# Patient Record
Sex: FEMALE | Race: WHITE | Employment: OTHER | URBAN - METROPOLITAN AREA
[De-identification: names, ages, dates, MRNs, and addresses within clinical notes are randomized per-mention and may not be internally consistent; named-entity substitution may affect disease eponyms.]

---

## 2017-11-08 ENCOUNTER — OFFICE VISIT (OUTPATIENT)
Dept: ENT CLINIC | Age: 67
End: 2017-11-08

## 2017-11-08 VITALS
DIASTOLIC BLOOD PRESSURE: 67 MMHG | WEIGHT: 278 LBS | HEIGHT: 65 IN | BODY MASS INDEX: 46.32 KG/M2 | SYSTOLIC BLOOD PRESSURE: 130 MMHG | HEART RATE: 78 BPM

## 2017-11-08 DIAGNOSIS — R05.3 PERSISTENT COUGH FOR 3 WEEKS OR LONGER: Primary | ICD-10-CM

## 2017-11-08 PROCEDURE — 96372 THER/PROPH/DIAG INJ SC/IM: CPT | Performed by: OTOLARYNGOLOGY

## 2017-11-08 PROCEDURE — 99214 OFFICE O/P EST MOD 30 MIN: CPT | Performed by: OTOLARYNGOLOGY

## 2017-11-08 RX ORDER — BENZONATATE 100 MG/1
100 CAPSULE ORAL 3 TIMES DAILY PRN
Qty: 30 CAPSULE | Refills: 1 | Status: SHIPPED | OUTPATIENT
Start: 2017-11-08

## 2017-11-08 RX ORDER — METHYLPREDNISOLONE ACETATE 40 MG/ML
40 INJECTION, SUSPENSION INTRA-ARTICULAR; INTRALESIONAL; INTRAMUSCULAR; SOFT TISSUE ONCE
Status: COMPLETED | OUTPATIENT
Start: 2017-11-08 | End: 2017-11-08

## 2017-11-08 RX ORDER — SIMVASTATIN 10 MG
10 TABLET ORAL NIGHTLY
COMMUNITY

## 2017-11-08 RX ORDER — OMEPRAZOLE 20 MG/1
20 CAPSULE, DELAYED RELEASE ORAL
COMMUNITY

## 2017-11-08 RX ADMIN — METHYLPREDNISOLONE ACETATE 40 MG: 40 INJECTION, SUSPENSION INTRA-ARTICULAR; INTRALESIONAL; INTRAMUSCULAR; SOFT TISSUE at 11:18

## 2017-11-08 NOTE — PROGRESS NOTES
The patient has been clearing her throat for approximately a month. Given her history of past vocal cord polyps, she wanted to make sure this was not the case. There has been a scratchy sensation which she feels radiates from the throat up to the back of her mouth. This has been associated with some dryness of her tongue and subsequent fissuring    There is been no further smoking since her vocal cord polyps were removed. Denies difficulty chewing or swallowing. No sores have been seen in the mouth. There has been no hoarseness or change in voice. There is no stridor or difficulty breathing. There is no past history of anterior neck or throat trauma. The cough is not productive and not associated with shortness of breath  It is not positional nerve related to geography or seasons    Her diabetes is well controlled on Glucophage. Her hyperlipidemia is treated with Zocor. She is also on Aldactone and Detrol. She has been started on 20 mg delayed release omeprazole with only slight impact to her throat clearing. She has 1 caffeinated beverage per day    The patient is a non smoker and is not exposed to second hand smoke or irritants. There have been no known contagious contacts. There are  no other symptoms referable to the upper aerodigestive tract. GENERAL:   The patient appears well developed and well nourished. The head is normocephalic and atraumatic; no facial scars or weakness are noted. The facial skeleton is normal.The voice has a normal quality and tonality to it. EARS:  The pinnae are normal bilaterally. The ear canals are clear with no cerumen or narrowing. Both eardrums are normal with good aeration of the middle ear space. There is no evidence of attic retraction pocket or cholesteatoma. The umbo and light reflex appear normal.    EYES:   The conjunctivae and lids appear normal. There is full extraocular movement.                   JAWS/DENTITION:  There is full ROM of the  TM joints without crepitus either audible or palpable. The dentition is grossly normal, including the gingiva. SALIVARY GLANDS:  The parotid and submandibular glands are normal in appearance. There are no palpable stones or masses. Clear secretions emanate from both Clover's and Rebecca's ducts. There is no periglandular adenopathy. NASAL:  The external nose including the dorsum, columella, alae, and nasion is unremarkable. The turbinates respond well to vasoconstriction. The middle and inferior meatuses appeared clear. No polyps, ulceration, or mass are visualized either naris. The nasal septum is midline. NASOPHARYNX:  The mirror exam of the nasopharynx shows no mucosal disease or obstruction. ORAL/PHARYNGEAL:   No abnormal dryness or discrete mucosal lesions are seen at the lips, oral cavity, or oropharynx. There is full tongue mobility, and the fungiform and vallate papillae appear normal. The floor of the mouth is unremarkable. . The palatoglossal and palatopharyngeal folds, uvula, and soft palate do not obstruct the oropharynx. HYPOPHARYNX/LARYNX:  Indirect laryngeal mirror exam shows a normal base of tongue, vallecula, and epiglottis. The aryepiglottic folds appeared normal. No pooling of saliva in the piriform sinuses. The post cricoid space is clear. There is mild nonspecific erythema here, but no pachydermia or cobblestoning Normal appearing plica ventricularis and arytenoids. Both cords are mobile on adduction and abduction. There are no further polyps seen in the larynx    NECK:  The neck is supple with full range of motion. The bony and cartilaginous landmarks are normal to palpation. There is no suspicious mass or adenopathy. The thyroid gland is normal to palpation, and the trachea is midline. CHEST/PULMONARY: Effort normal, no stridor. Not tachypneic. No respiratory distress    NEURO: The patient is alert and oriented. The cranial nerves are intact.     SKIN: Warm and dry; no

## 2018-02-21 ENCOUNTER — HOSPITAL ENCOUNTER (OUTPATIENT)
Dept: PULMONOLOGY | Age: 68
Discharge: HOME | End: 2018-02-21
Attending: INTERNAL MEDICINE | Admitting: INTERNAL MEDICINE

## 2018-02-21 VITALS — OXYGEN SATURATION: 97 %

## 2018-02-21 DIAGNOSIS — R06.02 SHORTNESS OF BREATH: ICD-10-CM

## 2018-02-21 RX ORDER — ALBUTEROL SULFATE 90 UG/1
4 AEROSOL, METERED RESPIRATORY (INHALATION) ONCE
Status: COMPLETED | OUTPATIENT
Start: 2018-02-21 | End: 2018-02-21

## 2018-02-21 RX ADMIN — ALBUTEROL SULFATE 4 PUFF: 90 AEROSOL, METERED RESPIRATORY (INHALATION) at 08:28

## 2019-02-21 ENCOUNTER — HOSPITAL ENCOUNTER (OUTPATIENT)
Age: 69
Discharge: HOME | End: 2019-02-21
Payer: MEDICARE

## 2019-02-21 ENCOUNTER — HOSPITAL ENCOUNTER (OUTPATIENT)
Dept: GENERAL RADIOLOGY | Age: 69
Discharge: HOME | End: 2019-02-21
Payer: MEDICARE

## 2019-02-21 DIAGNOSIS — M25.552 LEFT HIP PAIN: ICD-10-CM

## 2019-02-21 PROCEDURE — 73502 X-RAY EXAM HIP UNI 2-3 VIEWS: CPT

## 2020-09-04 ENCOUNTER — OFFICE VISIT (OUTPATIENT)
Dept: ENT CLINIC | Age: 70
End: 2020-09-04
Payer: MEDICARE

## 2020-09-04 VITALS
HEART RATE: 78 BPM | TEMPERATURE: 97.3 F | HEIGHT: 65 IN | DIASTOLIC BLOOD PRESSURE: 70 MMHG | WEIGHT: 277 LBS | BODY MASS INDEX: 46.15 KG/M2 | SYSTOLIC BLOOD PRESSURE: 124 MMHG

## 2020-09-04 PROCEDURE — 69210 REMOVE IMPACTED EAR WAX UNI: CPT | Performed by: OTOLARYNGOLOGY

## 2020-09-04 PROCEDURE — 99213 OFFICE O/P EST LOW 20 MIN: CPT | Performed by: OTOLARYNGOLOGY

## 2020-09-04 ASSESSMENT — ENCOUNTER SYMPTOMS
EYE PAIN: 0
DIARRHEA: 0
SHORTNESS OF BREATH: 0
NAUSEA: 0
COLOR CHANGE: 0
PHOTOPHOBIA: 0
TROUBLE SWALLOWING: 0
FACIAL SWELLING: 0
VOICE CHANGE: 0
STRIDOR: 0
SINUS PRESSURE: 0
SORE THROAT: 0
EYE ITCHING: 0
SINUS PAIN: 0
RHINORRHEA: 0
EYE REDNESS: 0
CHOKING: 0
COUGH: 0

## 2020-09-04 NOTE — PROGRESS NOTES
[facility]  [address] [address] [facility], [address] [place] [address]  P: [phone]  F: [phone]       Patient     Ellen Cortes  1950    ChiefComplaint     Chief Complaint   Patient presents with    Cerumen Impaction     Patient is here today because she is a former patient of Dr. Devika Carrillo, she has trouble with wax build up, her shehas a left ear infection that she has been treating it with alcohol, debrox and ear drops to help with the pain, it has helped with the symptoms but she would still like her ears looked at       History of Present Illness     Ellen Cortes is a pleasant 79 y.o. female known to a colleague, but new to me. Presents today for some muffled hearing, tinnitus and otalgia in the left. She does have a history of issues with cerumen impaction. She has had a previous audiogram which showed some mild hearing loss. She feels that her cerumen is causing hearing loss and tinnitus today. Denies any dizzy sensation. Denies any otorrhea. She has been using Debrox and alcohol drops with minimal relief. This is been going on for a few months now.     Past Medical History     Past Medical History:   Diagnosis Date    Arthritis     Cardiac murmur     Chronic edema     Diabetes mellitus (HCC)     Environmental allergies     on shots    Hypertension     Obesity     Venous insufficiency (chronic) (peripheral)     Vocal cord polyp        Past Surgical History     Past Surgical History:   Procedure Laterality Date    BACK SURGERY      COLPOSCOPY      JOINT REPLACEMENT  6/27/12    right total knee    KNEE SURGERY  2006    right    KNEE SURGERY  8/15/12    TOTAL LEFT KNEE REPLACEMENT    MICROLARYNGOSCOPY W BIOPSY  1/13/2012    nasal endoscopy    POLYPECTOMY      vocal cord       Family History     Family History   Problem Relation Age of Onset    Heart Disease Sister     Arthritis Sister     Arthritis Brother        Social History     Social History capsule 1    olopatadine (PATANASE) 0.6 % SOLN nassl soln 2 sprays by Nasal route 2 times daily 1 Bottle 0    budesonide (RINOCORT AQUA) 32 MCG/ACT nasal spray SPRAY TWO SPRAYS IN EACH NOSTRIL ONCE DAILY 1 Bottle 1    tolterodine (DETROL LA) 2 MG ER capsule       magnesium citrate solution Take 296 mLs by mouth once      metFORMIN (GLUCOPHAGE) 500 MG tablet Take 500 mg by mouth 2 times daily (with meals).  spironolactone (ALDACTONE) 50 MG tablet TAKE ONE AND ONE-HALF (1 & 1/2) TABLET BY MOUTH ONCE DAILY 135 tablet 1     No current facility-administered medications for this visit. Review of Systems     Review of Systems   Constitutional: Negative for chills, fatigue and fever. HENT: Negative for congestion, ear discharge, ear pain, facial swelling, hearing loss, nosebleeds, postnasal drip, rhinorrhea, sinus pressure, sinus pain, sneezing, sore throat, tinnitus, trouble swallowing and voice change. Eyes: Negative for photophobia, pain, redness, itching and visual disturbance. Respiratory: Negative for cough, choking, shortness of breath and stridor. Gastrointestinal: Negative for diarrhea and nausea. Musculoskeletal: Negative for neck pain and neck stiffness. Skin: Negative for color change and rash. Neurological: Negative for dizziness, facial asymmetry and light-headedness. Hematological: Negative for adenopathy. Psychiatric/Behavioral: Negative for agitation and confusion. PhysicalExam     Vitals:    09/04/20 1319   BP: 124/70   Pulse: 78   Temp: 97.3 °F (36.3 °C)       Physical Exam  Constitutional:       Appearance: She is well-developed. HENT:      Head: Normocephalic and atraumatic. Jaw: No trismus. Right Ear: Tympanic membrane, ear canal and external ear normal. No drainage. No middle ear effusion. There is impacted cerumen. Tympanic membrane is not perforated. Left Ear: Tympanic membrane, ear canal and external ear normal. No drainage.   No middle

## 2022-06-09 ENCOUNTER — TELEPHONE (OUTPATIENT)
Dept: CARDIOLOGY CLINIC | Age: 72
End: 2022-06-09

## 2022-06-09 NOTE — TELEPHONE ENCOUNTER
Pt is changing cardiologist due to moving. Pt has a new pt ov with [facility] on 07/21. Pt stated that she most recently has follow up with Dr. Audrey Ulrich at [facility]. [phone].

## 2022-12-19 ENCOUNTER — HOSPITAL ENCOUNTER (INPATIENT)
Age: 72
LOS: 4 days | Discharge: HOME | End: 2022-12-23
Attending: STUDENT IN AN ORGANIZED HEALTH CARE EDUCATION/TRAINING PROGRAM | Admitting: INTERNAL MEDICINE
Payer: MEDICARE

## 2022-12-19 DIAGNOSIS — L03.116 CELLULITIS OF LEFT LOWER EXTREMITY: Primary | ICD-10-CM

## 2022-12-19 PROBLEM — L03.90 CELLULITIS: Status: ACTIVE | Noted: 2022-12-19

## 2022-12-19 LAB
A/G RATIO: 1 (ref 1.1–2.2)
ALBUMIN SERPL-MCNC: 3.5 G/DL (ref 3.4–5)
ALP BLD-CCNC: 114 U/L (ref 40–129)
ALT SERPL-CCNC: 27 U/L (ref 10–40)
ANION GAP SERPL CALCULATED.3IONS-SCNC: 15 MMOL/L (ref 3–16)
AST SERPL-CCNC: 34 U/L (ref 15–37)
BASOPHILS ABSOLUTE: 0.1 K/UL (ref 0–0.2)
BASOPHILS RELATIVE PERCENT: 0.4 %
BILIRUB SERPL-MCNC: 0.7 MG/DL (ref 0–1)
BUN BLDV-MCNC: 27 MG/DL (ref 7–20)
CALCIUM SERPL-MCNC: 10 MG/DL (ref 8.3–10.6)
CHLORIDE BLD-SCNC: 98 MMOL/L (ref 99–110)
CO2: 20 MMOL/L (ref 21–32)
CREAT SERPL-MCNC: 1.7 MG/DL (ref 0.6–1.2)
EOSINOPHILS ABSOLUTE: 0 K/UL (ref 0–0.6)
EOSINOPHILS RELATIVE PERCENT: 0.2 %
GFR SERPL CREATININE-BSD FRML MDRD: 32 ML/MIN/{1.73_M2}
GLUCOSE BLD-MCNC: 111 MG/DL (ref 70–99)
GLUCOSE BLD-MCNC: 115 MG/DL (ref 70–99)
HCT VFR BLD CALC: 38.5 % (ref 36–48)
HEMOGLOBIN: 13.1 G/DL (ref 12–16)
INFLUENZA A: NOT DETECTED
INFLUENZA B: NOT DETECTED
LACTIC ACID: 2.2 MMOL/L (ref 0.4–2)
LYMPHOCYTES ABSOLUTE: 0.6 K/UL (ref 1–5.1)
LYMPHOCYTES RELATIVE PERCENT: 3.1 %
MCH RBC QN AUTO: 29.8 PG (ref 26–34)
MCHC RBC AUTO-ENTMCNC: 34 G/DL (ref 31–36)
MCV RBC AUTO: 87.7 FL (ref 80–100)
MONOCYTES ABSOLUTE: 0.7 K/UL (ref 0–1.3)
MONOCYTES RELATIVE PERCENT: 3.5 %
NEUTROPHILS ABSOLUTE: 18.5 K/UL (ref 1.7–7.7)
NEUTROPHILS RELATIVE PERCENT: 92.8 %
PDW BLD-RTO: 15.3 % (ref 12.4–15.4)
PERFORMED ON: ABNORMAL
PLATELET # BLD: 161 K/UL (ref 135–450)
PMV BLD AUTO: 9.2 FL (ref 5–10.5)
POTASSIUM REFLEX MAGNESIUM: 4.5 MMOL/L (ref 3.5–5.1)
RBC # BLD: 4.39 M/UL (ref 4–5.2)
SARS-COV-2 RNA, RT PCR: NOT DETECTED
SODIUM BLD-SCNC: 133 MMOL/L (ref 136–145)
TOTAL PROTEIN: 7.1 G/DL (ref 6.4–8.2)
WBC # BLD: 19.9 K/UL (ref 4–11)

## 2022-12-19 PROCEDURE — 1200000000 HC SEMI PRIVATE

## 2022-12-19 PROCEDURE — 83605 ASSAY OF LACTIC ACID: CPT

## 2022-12-19 PROCEDURE — 99285 EMERGENCY DEPT VISIT HI MDM: CPT

## 2022-12-19 PROCEDURE — 87040 BLOOD CULTURE FOR BACTERIA: CPT

## 2022-12-19 PROCEDURE — 6360000002 HC RX W HCPCS: Performed by: STUDENT IN AN ORGANIZED HEALTH CARE EDUCATION/TRAINING PROGRAM

## 2022-12-19 PROCEDURE — 85025 COMPLETE CBC W/AUTO DIFF WBC: CPT

## 2022-12-19 PROCEDURE — 6360000002 HC RX W HCPCS: Performed by: INTERNAL MEDICINE

## 2022-12-19 PROCEDURE — 36415 COLL VENOUS BLD VENIPUNCTURE: CPT

## 2022-12-19 PROCEDURE — 2580000003 HC RX 258: Performed by: INTERNAL MEDICINE

## 2022-12-19 PROCEDURE — 80053 COMPREHEN METABOLIC PANEL: CPT

## 2022-12-19 PROCEDURE — 87636 SARSCOV2 & INF A&B AMP PRB: CPT

## 2022-12-19 PROCEDURE — 2580000003 HC RX 258: Performed by: STUDENT IN AN ORGANIZED HEALTH CARE EDUCATION/TRAINING PROGRAM

## 2022-12-19 PROCEDURE — 96374 THER/PROPH/DIAG INJ IV PUSH: CPT

## 2022-12-19 PROCEDURE — 6370000000 HC RX 637 (ALT 250 FOR IP): Performed by: INTERNAL MEDICINE

## 2022-12-19 RX ORDER — ACETAMINOPHEN 325 MG/1
650 TABLET ORAL EVERY 6 HOURS PRN
Status: DISCONTINUED | OUTPATIENT
Start: 2022-12-19 | End: 2022-12-23 | Stop reason: HOSPADM

## 2022-12-19 RX ORDER — SODIUM CHLORIDE 0.9 % (FLUSH) 0.9 %
5-40 SYRINGE (ML) INJECTION EVERY 12 HOURS SCHEDULED
Status: DISCONTINUED | OUTPATIENT
Start: 2022-12-19 | End: 2022-12-23 | Stop reason: HOSPADM

## 2022-12-19 RX ORDER — ENOXAPARIN SODIUM 100 MG/ML
30 INJECTION SUBCUTANEOUS 2 TIMES DAILY
Status: DISCONTINUED | OUTPATIENT
Start: 2022-12-19 | End: 2022-12-20

## 2022-12-19 RX ORDER — ATORVASTATIN CALCIUM 40 MG/1
40 TABLET, FILM COATED ORAL DAILY
COMMUNITY

## 2022-12-19 RX ORDER — FUROSEMIDE 80 MG
80 TABLET ORAL DAILY PRN
COMMUNITY

## 2022-12-19 RX ORDER — ACETAMINOPHEN 650 MG/1
650 SUPPOSITORY RECTAL EVERY 6 HOURS PRN
Status: DISCONTINUED | OUTPATIENT
Start: 2022-12-19 | End: 2022-12-23 | Stop reason: HOSPADM

## 2022-12-19 RX ORDER — ASPIRIN 81 MG/1
81 TABLET, CHEWABLE ORAL DAILY
COMMUNITY

## 2022-12-19 RX ORDER — ASCORBIC ACID 500 MG
1000 TABLET ORAL DAILY
COMMUNITY

## 2022-12-19 RX ORDER — ZINC GLUCONATE 50 MG
50 TABLET ORAL DAILY
COMMUNITY

## 2022-12-19 RX ORDER — ONDANSETRON 2 MG/ML
4 INJECTION INTRAMUSCULAR; INTRAVENOUS EVERY 6 HOURS PRN
Status: DISCONTINUED | OUTPATIENT
Start: 2022-12-19 | End: 2022-12-23 | Stop reason: HOSPADM

## 2022-12-19 RX ORDER — INSULIN LISPRO 100 [IU]/ML
0-4 INJECTION, SOLUTION INTRAVENOUS; SUBCUTANEOUS
Status: DISCONTINUED | OUTPATIENT
Start: 2022-12-20 | End: 2022-12-23 | Stop reason: HOSPADM

## 2022-12-19 RX ORDER — INSULIN LISPRO 100 [IU]/ML
0-4 INJECTION, SOLUTION INTRAVENOUS; SUBCUTANEOUS NIGHTLY
Status: DISCONTINUED | OUTPATIENT
Start: 2022-12-19 | End: 2022-12-23 | Stop reason: HOSPADM

## 2022-12-19 RX ORDER — PANTOPRAZOLE SODIUM 40 MG/1
40 TABLET, DELAYED RELEASE ORAL
Status: DISCONTINUED | OUTPATIENT
Start: 2022-12-20 | End: 2022-12-19

## 2022-12-19 RX ORDER — WARFARIN SODIUM 5 MG/1
5 TABLET ORAL
COMMUNITY

## 2022-12-19 RX ORDER — CHOLECALCIFEROL (VITAMIN D3) 25 MCG
400 TABLET ORAL DAILY
COMMUNITY

## 2022-12-19 RX ORDER — SPIRONOLACTONE 25 MG/1
50 TABLET ORAL DAILY
Status: DISCONTINUED | OUTPATIENT
Start: 2022-12-20 | End: 2022-12-23 | Stop reason: HOSPADM

## 2022-12-19 RX ORDER — ONDANSETRON 4 MG/1
4 TABLET, ORALLY DISINTEGRATING ORAL EVERY 8 HOURS PRN
Status: DISCONTINUED | OUTPATIENT
Start: 2022-12-19 | End: 2022-12-23 | Stop reason: HOSPADM

## 2022-12-19 RX ORDER — DEXTROSE MONOHYDRATE 100 MG/ML
INJECTION, SOLUTION INTRAVENOUS CONTINUOUS PRN
Status: DISCONTINUED | OUTPATIENT
Start: 2022-12-19 | End: 2022-12-23 | Stop reason: HOSPADM

## 2022-12-19 RX ORDER — POLYETHYLENE GLYCOL 3350 17 G/17G
17 POWDER, FOR SOLUTION ORAL DAILY PRN
Status: DISCONTINUED | OUTPATIENT
Start: 2022-12-19 | End: 2022-12-23 | Stop reason: HOSPADM

## 2022-12-19 RX ORDER — SODIUM CHLORIDE 9 MG/ML
INJECTION, SOLUTION INTRAVENOUS PRN
Status: DISCONTINUED | OUTPATIENT
Start: 2022-12-19 | End: 2022-12-23 | Stop reason: HOSPADM

## 2022-12-19 RX ORDER — SODIUM CHLORIDE 0.9 % (FLUSH) 0.9 %
5-40 SYRINGE (ML) INJECTION PRN
Status: DISCONTINUED | OUTPATIENT
Start: 2022-12-19 | End: 2022-12-23 | Stop reason: HOSPADM

## 2022-12-19 RX ORDER — ATORVASTATIN CALCIUM 10 MG/1
20 TABLET, FILM COATED ORAL NIGHTLY
Status: DISCONTINUED | OUTPATIENT
Start: 2022-12-19 | End: 2022-12-19 | Stop reason: DRUGHIGH

## 2022-12-19 RX ORDER — ATORVASTATIN CALCIUM 40 MG/1
40 TABLET, FILM COATED ORAL NIGHTLY
Status: DISCONTINUED | OUTPATIENT
Start: 2022-12-19 | End: 2022-12-23 | Stop reason: HOSPADM

## 2022-12-19 RX ADMIN — ACETAMINOPHEN 650 MG: 325 TABLET ORAL at 20:49

## 2022-12-19 RX ADMIN — CEFAZOLIN 2000 MG: 2 INJECTION, POWDER, FOR SOLUTION INTRAMUSCULAR; INTRAVENOUS at 17:03

## 2022-12-19 RX ADMIN — AMPICILLIN SODIUM AND SULBACTAM SODIUM 3000 MG: 2; 1 INJECTION, POWDER, FOR SOLUTION INTRAMUSCULAR; INTRAVENOUS at 22:35

## 2022-12-19 RX ADMIN — ONDANSETRON 4 MG: 4 TABLET, ORALLY DISINTEGRATING ORAL at 20:49

## 2022-12-19 ASSESSMENT — PAIN DESCRIPTION - PAIN TYPE: TYPE: ACUTE PAIN

## 2022-12-19 ASSESSMENT — PAIN DESCRIPTION - ORIENTATION: ORIENTATION: LEFT

## 2022-12-19 ASSESSMENT — PAIN DESCRIPTION - LOCATION: LOCATION: LEG

## 2022-12-19 ASSESSMENT — PAIN SCALES - GENERAL
PAINLEVEL_OUTOF10: 4
PAINLEVEL_OUTOF10: 6

## 2022-12-19 ASSESSMENT — PAIN DESCRIPTION - ONSET: ONSET: ON-GOING

## 2022-12-19 ASSESSMENT — PAIN DESCRIPTION - DESCRIPTORS: DESCRIPTORS: ACHING;TENDER

## 2022-12-19 ASSESSMENT — PAIN - FUNCTIONAL ASSESSMENT
PAIN_FUNCTIONAL_ASSESSMENT: 0-10
PAIN_FUNCTIONAL_ASSESSMENT: ACTIVITIES ARE NOT PREVENTED

## 2022-12-19 ASSESSMENT — PAIN DESCRIPTION - FREQUENCY: FREQUENCY: INTERMITTENT

## 2022-12-19 NOTE — ED PROVIDER NOTES
[address] ED      CHIEF COMPLAINT  Leg Swelling (Left lower leg red and swollen; pt states she has chronic cellulitis to left leg; increased redness and swelling x 4 days )     HISTORY OF PRESENT ILLNESS  Adry Nicole is a 67 y.o. female  who presents to the ED complaining of left lower extremity pain, swelling, and redness. She has had a history of recurrent cellulitis to her left leg and has required IV antibiotics in the past.  States that symptoms worsened over the past 4 days. She states that she has had some fevers at home. Has not been on any recent antibiotics. She denies any other complaints or concerns. No other complaints, modifying factors or associated symptoms. I have reviewed the following from the nursing documentation.     Past Medical History:   Diagnosis Date    Arthritis     Cardiac murmur     Chronic edema     Diabetes mellitus (HCC)     Environmental allergies     on shots    Hypertension     Obesity     Venous insufficiency (chronic) (peripheral)     Vocal cord polyp      Past Surgical History:   Procedure Laterality Date    BACK SURGERY      COLPOSCOPY      JOINT REPLACEMENT  6/27/12    right total knee    KNEE SURGERY  2006    right    KNEE SURGERY  8/15/12    TOTAL LEFT KNEE REPLACEMENT    MICROLARYNGOSCOPY W BIOPSY  1/13/2012    nasal endoscopy    POLYPECTOMY      vocal cord     Family History   Problem Relation Age of Onset    Heart Disease Sister     Arthritis Sister     Arthritis Brother      Social History     Socioeconomic History    Marital status:      Spouse name: Not on file    Number of children: Not on file    Years of education: Not on file    Highest education level: Not on file   Occupational History    Not on file   Tobacco Use    Smoking status: Former     Packs/day: 1.00     Years: 40.00     Pack years: 40.00     Types: Cigarettes    Smokeless tobacco: Never   Substance and Sexual Activity    Alcohol use: Yes     Comment: 1/day    Drug use: No    Sexual activity: Not on file   Other Topics Concern    Not on file   Social History Narrative    Not on file     Social Determinants of Health     Financial Resource Strain: Not on file   Food Insecurity: Not on file   Transportation Needs: Not on file   Physical Activity: Not on file   Stress: Not on file   Social Connections: Not on file   Intimate Partner Violence: Not on file   Housing Stability: Not on file     No current facility-administered medications for this encounter. Current Outpatient Medications   Medication Sig Dispense Refill    omeprazole (PRILOSEC) 20 MG delayed release capsule Take 20 mg by mouth      simvastatin (ZOCOR) 10 MG tablet Take 10 mg by mouth nightly      benzonatate (TESSALON PERLES) 100 MG capsule Take 1 capsule by mouth 3 times daily as needed for Cough 30 capsule 1    olopatadine (PATANASE) 0.6 % SOLN nassl soln 2 sprays by Nasal route 2 times daily 1 Bottle 0    budesonide (RINOCORT AQUA) 32 MCG/ACT nasal spray SPRAY TWO SPRAYS IN EACH NOSTRIL ONCE DAILY 1 Bottle 1    tolterodine (DETROL LA) 2 MG ER capsule       magnesium citrate solution Take 296 mLs by mouth once      metFORMIN (GLUCOPHAGE) 500 MG tablet Take 500 mg by mouth 2 times daily (with meals). spironolactone (ALDACTONE) 50 MG tablet TAKE ONE AND ONE-HALF (1 & 1/2) TABLET BY MOUTH ONCE DAILY 135 tablet 1     Allergies   Allergen Reactions    Ace Inhibitors     Lisinopril Swelling     throat    Mobic Swelling     throat    Morphine Hives, Itching and Swelling     Swelling face and neck       REVIEW OF SYSTEMS  10 systems reviewed, pertinent positives per HPI otherwise noted to be negative. PHYSICAL EXAM  /64   Pulse 76   Temp 97.1 °F (36.2 °C) (Oral)   Resp 18   Ht 5' 5\" (1.651 m)   Wt 277 lb (125.6 kg)   SpO2 100%   BMI 46.10 kg/m²    GENERAL APPEARANCE: Awake and alert. Cooperative. No acute distress. HENT: Normocephalic. Atraumatic. Mucous membranes are moist.  NECK: Supple. 20 mg/dL    Creatinine 1.7 (H) 0.6 - 1.2 mg/dL    EstAnant Filt Rate 32 (A) >60    Calcium 10.0 8.3 - 10.6 mg/dL    Total Protein 7.1 6.4 - 8.2 g/dL    Albumin 3.5 3.4 - 5.0 g/dL    Albumin/Globulin Ratio 1.0 (L) 1.1 - 2.2    Total Bilirubin 0.7 0.0 - 1.0 mg/dL    Alkaline Phosphatase 114 40 - 129 U/L    ALT 27 10 - 40 U/L    AST 34 15 - 37 U/L   Lactic Acid   Result Value Ref Range    Lactic Acid 2.2 (H) 0.4 - 2.0 mmol/L     RADIOLOGY  No orders to display     ED COURSE/MDM  Patient seen and evaluated. Old records reviewed. Labs and imaging reviewed and results discussed with patient. Patient is a 77-year-old female, with history of recurrent cellulitis of the left leg, presenting with concerns for redness warmth swelling and pain in her left lower extremity for the past 4 days. Full HPI as detailed above. Upon arrival in the ED, vitals reassuring. Patient is resting comfortably and is in no acute distress. She does have extensive cellulitis of her left leg. Lactate is mildly elevated at 2.2. She has a white count of 19,000. She has been septic from cellulitis in the past and given how extensive her cellulitis is currently I do feel that she requires hospitalization for treatment with IV antibiotics. She is comfortable in agreement with the plan of care. I, Dr. Mg Gunderson MD, am the primary clinician of record. Is this patient to be included in the SEP-1 Core Measure? No   Exclusion criteria - the patient is NOT to be included for SEP-1 Core Measure due to:  2+ SIRS criteria are not met     During the patient's ED course, the patient was given:  Medications   ceFAZolin (ANCEF) 2,000 mg in sodium chloride 0.9 % 50 mL IVPB (mini-bag) (2,000 mg IntraVENous New Bag 12/19/22 1703)        CLINICAL IMPRESSION  1. Cellulitis of left lower extremity        Blood pressure 107/64, pulse 76, temperature 97.1 °F (36.2 °C), temperature source Oral, resp.  rate 18, height 5' 5\" (1.651 m), weight 277 lb (125.6 kg), SpO2 100 %, not currently breastfeeding. [address] Patience Oiler was admitted in stable condition. Patient was given scripts for the following medications. I counseled patient how to take these medications. New Prescriptions    No medications on file       Follow-up with:  No follow-up provider specified. DISCLAIMER: This chart was created using Dragon dictation software. Efforts were made by me to ensure accuracy, however some errors may be present due to limitations of this technology and occasionally words are not transcribed correctly.        Daniella Galindo MD  12/19/22 0845

## 2022-12-19 NOTE — ED NOTES
[address] [facility] sent to Dr. Adriano Devlin  12/19/22 2146 [id number] consult completed with call back from Dr. Charity Edmondson speaking with Dr. Soo Au  12/19/22 [phone]

## 2022-12-20 PROBLEM — I95.9 HYPOTENSION: Status: ACTIVE | Noted: 2022-12-20

## 2022-12-20 PROBLEM — N17.9 SEPSIS WITH ACUTE RENAL FAILURE WITHOUT SEPTIC SHOCK (HCC): Status: ACTIVE | Noted: 2022-12-20

## 2022-12-20 PROBLEM — A41.9 SEPSIS WITH ACUTE RENAL FAILURE WITHOUT SEPTIC SHOCK (HCC): Status: ACTIVE | Noted: 2022-12-20

## 2022-12-20 PROBLEM — N17.9 AKI (ACUTE KIDNEY INJURY) (HCC): Status: ACTIVE | Noted: 2022-12-20

## 2022-12-20 PROBLEM — D72.829 LEUKOCYTOSIS: Status: ACTIVE | Noted: 2022-12-20

## 2022-12-20 PROBLEM — R65.20 SEPSIS WITH ACUTE RENAL FAILURE WITHOUT SEPTIC SHOCK (HCC): Status: ACTIVE | Noted: 2022-12-20

## 2022-12-20 LAB
BASOPHILS ABSOLUTE: 0.1 K/UL (ref 0–0.2)
BASOPHILS RELATIVE PERCENT: 0.4 %
EOSINOPHILS ABSOLUTE: 0 K/UL (ref 0–0.6)
EOSINOPHILS RELATIVE PERCENT: 0.1 %
GLUCOSE BLD-MCNC: 103 MG/DL (ref 70–99)
GLUCOSE BLD-MCNC: 113 MG/DL (ref 70–99)
GLUCOSE BLD-MCNC: 88 MG/DL (ref 70–99)
GLUCOSE BLD-MCNC: 99 MG/DL (ref 70–99)
HCT VFR BLD CALC: 39.1 % (ref 36–48)
HEMOGLOBIN: 13.1 G/DL (ref 12–16)
INR BLD: 2.13 (ref 0.87–1.14)
LYMPHOCYTES ABSOLUTE: 1 K/UL (ref 1–5.1)
LYMPHOCYTES RELATIVE PERCENT: 5.4 %
MCH RBC QN AUTO: 29.4 PG (ref 26–34)
MCHC RBC AUTO-ENTMCNC: 33.4 G/DL (ref 31–36)
MCV RBC AUTO: 88 FL (ref 80–100)
MONOCYTES ABSOLUTE: 0.8 K/UL (ref 0–1.3)
MONOCYTES RELATIVE PERCENT: 4.6 %
NEUTROPHILS ABSOLUTE: 16.1 K/UL (ref 1.7–7.7)
NEUTROPHILS RELATIVE PERCENT: 89.5 %
PDW BLD-RTO: 15.3 % (ref 12.4–15.4)
PERFORMED ON: ABNORMAL
PERFORMED ON: ABNORMAL
PERFORMED ON: NORMAL
PERFORMED ON: NORMAL
PLATELET # BLD: 168 K/UL (ref 135–450)
PMV BLD AUTO: 9.7 FL (ref 5–10.5)
PROTHROMBIN TIME: 23.7 SEC (ref 11.7–14.5)
RBC # BLD: 4.45 M/UL (ref 4–5.2)
WBC # BLD: 18 K/UL (ref 4–11)

## 2022-12-20 PROCEDURE — 1200000000 HC SEMI PRIVATE

## 2022-12-20 PROCEDURE — 6370000000 HC RX 637 (ALT 250 FOR IP): Performed by: INTERNAL MEDICINE

## 2022-12-20 PROCEDURE — 6370000000 HC RX 637 (ALT 250 FOR IP): Performed by: NURSE PRACTITIONER

## 2022-12-20 PROCEDURE — 85610 PROTHROMBIN TIME: CPT

## 2022-12-20 PROCEDURE — 36415 COLL VENOUS BLD VENIPUNCTURE: CPT

## 2022-12-20 PROCEDURE — 2580000003 HC RX 258: Performed by: NURSE PRACTITIONER

## 2022-12-20 PROCEDURE — 99222 1ST HOSP IP/OBS MODERATE 55: CPT | Performed by: NURSE PRACTITIONER

## 2022-12-20 PROCEDURE — 2580000003 HC RX 258: Performed by: INTERNAL MEDICINE

## 2022-12-20 PROCEDURE — 6360000002 HC RX W HCPCS: Performed by: INTERNAL MEDICINE

## 2022-12-20 PROCEDURE — 85025 COMPLETE CBC W/AUTO DIFF WBC: CPT

## 2022-12-20 RX ORDER — ALBUTEROL SULFATE 90 UG/1
2 AEROSOL, METERED RESPIRATORY (INHALATION) EVERY 4 HOURS PRN
Status: DISCONTINUED | OUTPATIENT
Start: 2022-12-20 | End: 2022-12-23 | Stop reason: HOSPADM

## 2022-12-20 RX ORDER — ASPIRIN 81 MG/1
81 TABLET, CHEWABLE ORAL DAILY
Status: DISCONTINUED | OUTPATIENT
Start: 2022-12-20 | End: 2022-12-23 | Stop reason: HOSPADM

## 2022-12-20 RX ORDER — SODIUM CHLORIDE 9 MG/ML
INJECTION, SOLUTION INTRAVENOUS CONTINUOUS
Status: DISCONTINUED | OUTPATIENT
Start: 2022-12-20 | End: 2022-12-21

## 2022-12-20 RX ORDER — HYDROXYZINE HYDROCHLORIDE 10 MG/1
10 TABLET, FILM COATED ORAL 3 TIMES DAILY PRN
Status: DISCONTINUED | OUTPATIENT
Start: 2022-12-20 | End: 2022-12-23 | Stop reason: HOSPADM

## 2022-12-20 RX ADMIN — AMPICILLIN SODIUM AND SULBACTAM SODIUM 3000 MG: 2; 1 INJECTION, POWDER, FOR SOLUTION INTRAMUSCULAR; INTRAVENOUS at 23:17

## 2022-12-20 RX ADMIN — WARFARIN SODIUM 7 MG: 5 TABLET ORAL at 17:43

## 2022-12-20 RX ADMIN — SODIUM CHLORIDE: 9 INJECTION, SOLUTION INTRAVENOUS at 12:34

## 2022-12-20 RX ADMIN — AMPICILLIN SODIUM AND SULBACTAM SODIUM 3000 MG: 2; 1 INJECTION, POWDER, FOR SOLUTION INTRAMUSCULAR; INTRAVENOUS at 04:39

## 2022-12-20 RX ADMIN — AMPICILLIN SODIUM AND SULBACTAM SODIUM 3000 MG: 2; 1 INJECTION, POWDER, FOR SOLUTION INTRAMUSCULAR; INTRAVENOUS at 12:35

## 2022-12-20 RX ADMIN — ONDANSETRON 4 MG: 4 TABLET, ORALLY DISINTEGRATING ORAL at 04:50

## 2022-12-20 RX ADMIN — METFORMIN HYDROCHLORIDE 500 MG: 500 TABLET ORAL at 08:27

## 2022-12-20 RX ADMIN — ACETAMINOPHEN 650 MG: 325 TABLET ORAL at 14:15

## 2022-12-20 RX ADMIN — SODIUM CHLORIDE: 9 INJECTION, SOLUTION INTRAVENOUS at 17:38

## 2022-12-20 RX ADMIN — ACETAMINOPHEN 650 MG: 325 TABLET ORAL at 04:50

## 2022-12-20 RX ADMIN — AMPICILLIN SODIUM AND SULBACTAM SODIUM 3000 MG: 2; 1 INJECTION, POWDER, FOR SOLUTION INTRAMUSCULAR; INTRAVENOUS at 17:41

## 2022-12-20 RX ADMIN — SPIRONOLACTONE 50 MG: 25 TABLET ORAL at 08:27

## 2022-12-20 RX ADMIN — ASPIRIN 81 MG: 81 TABLET, CHEWABLE ORAL at 14:15

## 2022-12-20 RX ADMIN — SODIUM CHLORIDE, PRESERVATIVE FREE 10 ML: 5 INJECTION INTRAVENOUS at 12:41

## 2022-12-20 RX ADMIN — HYDROXYZINE HYDROCHLORIDE 10 MG: 10 TABLET ORAL at 22:22

## 2022-12-20 RX ADMIN — ENOXAPARIN SODIUM 30 MG: 100 INJECTION SUBCUTANEOUS at 08:27

## 2022-12-20 RX ADMIN — ATORVASTATIN CALCIUM 40 MG: 40 TABLET, FILM COATED ORAL at 20:32

## 2022-12-20 ASSESSMENT — PAIN DESCRIPTION - DESCRIPTORS
DESCRIPTORS: ACHING;TENDER;BURNING
DESCRIPTORS: PRESSURE
DESCRIPTORS: ACHING;BURNING

## 2022-12-20 ASSESSMENT — PAIN DESCRIPTION - ORIENTATION
ORIENTATION: LEFT

## 2022-12-20 ASSESSMENT — PAIN - FUNCTIONAL ASSESSMENT
PAIN_FUNCTIONAL_ASSESSMENT: ACTIVITIES ARE NOT PREVENTED
PAIN_FUNCTIONAL_ASSESSMENT: PREVENTS OR INTERFERES SOME ACTIVE ACTIVITIES AND ADLS

## 2022-12-20 ASSESSMENT — PAIN DESCRIPTION - ONSET: ONSET: ON-GOING

## 2022-12-20 ASSESSMENT — PAIN DESCRIPTION - PAIN TYPE: TYPE: ACUTE PAIN

## 2022-12-20 ASSESSMENT — PAIN DESCRIPTION - LOCATION
LOCATION: LEG

## 2022-12-20 ASSESSMENT — PAIN SCALES - GENERAL
PAINLEVEL_OUTOF10: 2
PAINLEVEL_OUTOF10: 4
PAINLEVEL_OUTOF10: 2

## 2022-12-20 ASSESSMENT — PAIN DESCRIPTION - FREQUENCY: FREQUENCY: INTERMITTENT

## 2022-12-20 NOTE — PLAN OF CARE
Problem: Pain  Goal: Verbalizes/displays adequate comfort level or baseline comfort level  Flowsheets (Taken 12/20/2022 4628)  Verbalizes/displays adequate comfort level or baseline comfort level: Encourage patient to monitor pain and request assistance   Patient up as tolerated, denies complaints.

## 2022-12-20 NOTE — PROGRESS NOTES
This RN has provided evening medications for primary RN. Patient has refused her lovenox injection. Prn zofran and tylenol provided per mar.

## 2022-12-20 NOTE — PROGRESS NOTES
Pharmacy Note  Warfarin Consult  Dx: AVR  Goal INR range  2.5-3.5 (per consult note)  Home Warfarin dose:5mg daily  New start Warfarin with Heparin/Enoxaparin bridge. Would recommend continue bridge until INR therapeutic. Date  INR  Warfarin  12/20             2.13                   7mg  Recommend Warfarin7 mg tonight x1. Daily INR ordered. Rx will continue to manage therapy per consult order.   Edgar Dominguez Pharm D 12/20/20222:06 PM  .

## 2022-12-20 NOTE — PROGRESS NOTES
RT Inhaler-Nebulizer Bronchodilator Protocol Note    There is a bronchodilator order in the chart from a provider indicating to follow the RT Bronchodilator Protocol and there is an Initiate RT Inhaler-Nebulizer Bronchodilator Protocol order as well (see protocol at bottom of note). CXR Findings:  No results found. The findings from the last RT Protocol Assessment were as follows:   History Pulmonary Disease: (P) Chronic pulmonary disease  Respiratory Pattern: (P) Regular pattern and RR 12-20 bpm  Breath Sounds: (P) Clear breath sounds  Cough: (P) Strong, spontaneous, non-productive  Indication for Bronchodilator Therapy: (P) On home bronchodilators  Bronchodilator Assessment Score: (P) 2    Aerosolized bronchodilator medication orders have been revised according to the RT Inhaler-Nebulizer Bronchodilator Protocol below. Respiratory Therapist to perform RT Therapy Protocol Assessment initially then follow the protocol. Repeat RT Therapy Protocol Assessment PRN for score 0-3 or on second treatment, BID, and PRN for scores above 3. No Indications - adjust the frequency to every 6 hours PRN wheezing or bronchospasm, if no treatments needed after 48 hours then discontinue using Per Protocol order mode. If indication present, adjust the RT bronchodilator orders based on the Bronchodilator Assessment Score as indicated below. Use Inhaler orders unless patient has one or more of the following: on home nebulizer, not able to hold breath for 10 seconds, is not alert and oriented, cannot activate and use MDI correctly, or respiratory rate 25 breaths per minute or more, then use the equivalent nebulizer order(s) with same Frequency and PRN reasons based on the score. If a patient is on this medication at home then do not decrease Frequency below that used at home.     0-3 - enter or revise RT bronchodilator order(s) to equivalent RT Bronchodilator order with Frequency of every 4 hours PRN for wheezing or increased work of breathing using Per Protocol order mode. 4-6 - enter or revise RT Bronchodilator order(s) to two equivalent RT bronchodilator orders with one order with BID Frequency and one order with Frequency of every 4 hours PRN wheezing or increased work of breathing using Per Protocol order mode. 7-10 - enter or revise RT Bronchodilator order(s) to two equivalent RT bronchodilator orders with one order with TID Frequency and one order with Frequency of every 4 hours PRN wheezing or increased work of breathing using Per Protocol order mode. 11-13 - enter or revise RT Bronchodilator order(s) to one equivalent RT bronchodilator order with QID Frequency and an Albuterol order with Frequency of every 4 hours PRN wheezing or increased work of breathing using Per Protocol order mode. Greater than 13 - enter or revise RT Bronchodilator order(s) to one equivalent RT bronchodilator order with every 4 hours Frequency and an Albuterol order with Frequency of every 2 hours PRN wheezing or increased work of breathing using Per Protocol order mode.          Electronically signed by Jacoby Velásquez RCP on 12/20/2022 at 5:59 PM

## 2022-12-20 NOTE — PROGRESS NOTES
4 Eyes Skin Assessment     The patient is being assess for   Admission    I agree that 2 RN's have performed a thorough Head to Toe Skin Assessment on the patient. ALL assessment sites listed below have been assessed. Areas assessed for pressure by both nurses:   [x]   Head, Face, and Ears   [x]   Shoulders, Back, and Chest, Abdomen  [x]   Arms, Elbows, and Hands   [x]   Coccyx, Sacrum, and Ischium  [x]   Legs, Feet, and Heels          Left leg ankle to groin red, cellulitis          **SHARE this note so that the co-signing nurse is able to place an eSignature**    Co-signer eSignature: Electronically signed by Yoselin Maloney RN on 12/20/22 at 6:19 PM EST    Does the Patient have Skin Breakdown related to pressure?   No              Fidencio Prevention initiated:  NA   Wound Care Orders initiated:  NA      WOC nurse consulted for Pressure Injury (Stage 3,4, Unstageable, DTI, NWPT, Complex wounds)and New or Established Ostomies:  NA      Primary Nurse eSignature: Electronically signed by Loly Toure RN on 12/20/22 at 6:12 PM EST

## 2022-12-20 NOTE — PROGRESS NOTES
Patient admitted to room 219 via wheelchair from ER. Patient alert and oriented, denies complaints at this time. Call light in reach.

## 2022-12-20 NOTE — PLAN OF CARE
Problem: ABCDS Injury Assessment  Goal: Absence of physical injury  Outcome: Progressing     Problem: Skin/Tissue Integrity  Goal: Absence of new skin breakdown  Description: 1. Monitor for areas of redness and/or skin breakdown  2. Assess vascular access sites hourly  3. Every 4-6 hours minimum:  Change oxygen saturation probe site  4. Every 4-6 hours:  If on nasal continuous positive airway pressure, respiratory therapy assess nares and determine need for appliance change or resting period. Outcome: Progressing     Problem: Pain  Goal: Verbalizes/displays adequate comfort level or baseline comfort level  Outcome: Progressing   15 minutes pt education provided at this time.

## 2022-12-20 NOTE — CARE COORDINATION
Case Management Assessment  Initial Evaluation      Patient Name: Maty Hurtado  YOB: 1950  Diagnosis: Cellulitis [L03.90]  Date / Time: 12/19/2022  4:12 PM    Admission status/Date:Inpatient 12/19/2022  Chart Reviewed: Yes      Patient Interviewed: Yes   Family Interviewed:  Yes - spouse at bedside and participated in conversation with patient's permission. Hospitalization in the last 30 days:  No      Health Care Decision Maker :   Primary Decision Maker: Samara Michelle Spouse - [phone]    Who do you trust or have selected to make healthcare decisions for you      Met with: Patient and spouse at bedside. Current PCP: PCP no longer with [facility]. Information for [facility] provided on discharge instructions.     Financial  [employer] [facility] [email or web address]  Precert required for SNF : Y          3 night stay required - [employer]  Support Systems/Care Needs: Spouse/Significant Other, Family Members, Friends/Neighbors  Transportation: self   Meal Preparation: self    Housing  Living Arrangements: Lives in single story home with spouse  Steps: 0 [address]  Intent for return to present living arrangements: Yes  Identified Issues: see note below    [address] with [address] : No Agency:(Services)  Type of Home Care Services: None  Passport/Waiver : No  :                      Phone Number:    Passport/Waiver Services: n/a          Durable Medical Equiptment   DME Provider: n/a   Equipment:   Walker___Cane___RTS___ BSC___Shower Chair___Hospital Bed___W/C____Other__treking poles with outside ambulation______  02 at ____Liter(s)---wears(frequency)_______ [facility] ___ CPAP___ BiPap___   N/A____      Home O2 Use :  No    If No for home O2---if presently on O2 during hospitalization:  No  if yes CM to follow for potential DC O2 need  Informed of need for care provider to bring portable home O2 tank on day of discharge for nursing to connect prior to leaving:   Not Indicated  Verbalized agreement/Understanding:   Not Indicated    Community Service Affiliation  Dialysis:  No    Agency:  Location:  Dialysis Schedule:  Phone:   Fax: Other Community Services: n/a     DISCHARGE PLAN: Explained Case Management role/services. CM reviewed chart and met with patient at bedside to discuss discharge needs and plan. Patient lives in home with spouse. IPTA and +drives. Plans return home at discharge. Admitted with cellulitis left lower ext; Currently on IV ABX every 6 hours. CM following.     Gabriela Patricio RN

## 2022-12-20 NOTE — H&P
Hospital Medicine History & Physical      PCP: Augustin Knowles MD    Date of Admission: 12/19/2022    Date of Service: Pt seen/examined on 12/20/2022     Chief Complaint:    Chief Complaint   Patient presents with    Leg Swelling     Left lower leg red and swollen; pt states she has chronic cellulitis to left leg; increased redness and swelling x 4 days        History Of Present Illness: The patient is a 67 y.o. female with hypertension, environmental allergies, DM type 2, and arthritis who presents to [address] [place] [address] with c/o left lower extremity redness and swelling. Onset was 5 days ago. It started with tenderness. Yesterday she developed redness. 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 She reports fevers, chills, nausea, and poor appetite. Denies cough, chest pain. She get short of breath with exertion sometimes when she is outside working. She did have a fever of 102. 1. BP borderline hypotensive today. Labs with lactic acidosis, and leukocytosis. Also seems to have an KRZYSZTOF. Admitted for further management/care. Past Medical History:        Diagnosis Date    Arthritis     Cardiac murmur     Chronic edema     Diabetes mellitus (HCC)     Environmental allergies     on shots    Hypertension     Obesity     Venous insufficiency (chronic) (peripheral)     Vocal cord polyp        Past Surgical History:        Procedure Laterality Date    ANOMALOUS VENOUS RETURN REPAIR      per pt TAVR was done at [address] [place] [address] [phone]    BACK SURGERY      COLPOSCOPY      JOINT REPLACEMENT  06/27/2012    right total knee    KNEE SURGERY  01/01/2006    right    KNEE SURGERY  08/15/2012    TOTAL LEFT KNEE REPLACEMENT    MICROLARYNGOSCOPY W BIOPSY  01/13/2012    nasal endoscopy    POLYPECTOMY      vocal cord       Medications Prior to Admission:    Prior to Admission medications    Medication Sig Start Date End Date Taking?  Authorizing Provider   furosemide (LASIX) 80 MG tablet Take 80 mg by mouth daily as needed   Yes Historical Provider, MD   metoprolol tartrate (LOPRESSOR) 25 MG tablet Take 25 mg by mouth daily   Yes Historical Provider, MD   warfarin (COUMADIN) 5 MG tablet Take 5 mg by mouth   Yes Historical Provider, MD   Albuterol Sulfate (PROVENTIL HFA IN) Inhale into the lungs every 4 hours as needed (2 inhales every 4 hours as needed)   Yes Historical Provider, MD   vitamin C (ASCORBIC ACID) 500 MG tablet Take 1,000 mg by mouth daily   Yes Historical Provider, MD   aspirin 81 MG chewable tablet Take 81 mg by mouth daily   Yes Historical Provider, MD   Cholecalciferol (VITAMIN D3) 25 MCG TABS Take 400 Units by mouth daily   Yes Historical Provider, MD   zinc gluconate 50 MG tablet Take 50 mg by mouth daily   Yes Historical Provider, MD   atorvastatin (LIPITOR) 40 MG tablet Take 40 mg by mouth daily   Yes Historical Provider, MD   omeprazole (PRILOSEC) 20 MG delayed release capsule Take 20 mg by mouth  Patient not taking: Reported on 12/19/2022    Historical Provider, MD   benzonatate (TESSALON PERLES) 100 MG capsule Take 1 capsule by mouth 3 times daily as needed for Cough  Patient not taking: Reported on 12/19/2022 11/8/17   Vinh Mei MD   olopatadine (PATANASE) 0.6 % SOLN nassl soln 2 sprays by Nasal route 2 times daily  Patient not taking: Reported on 12/19/2022 11/10/16   Vinh Mei MD   budesonide (RINOCORT AQUA) 32 MCG/ACT nasal spray SPRAY TWO SPRAYS IN [facility] NOSTRIL ONCE DAILY  Patient not taking: Reported on 12/19/2022 5/20/16   Vinh Mei MD   tolterodine (DETROL LA) 2 MG ER capsule  3/17/16   Historical Provider, MD   magnesium citrate solution Take 296 mLs by mouth once  Patient not taking: Reported on 12/19/2022    Historical Provider, MD   metFORMIN (GLUCOPHAGE) 500 MG tablet Take 500 mg by mouth 2 times daily (with meals).     Historical Provider, MD   spironolactone (ALDACTONE) 50 MG tablet TAKE ONE AND ONE-HALF (1 & 1/2) TABLET BY MOUTH ONCE DAILY 5/6/13   Deanna Washington MD Allergies:  Ace inhibitors, Lisinopril, Mobic, and Morphine    Social History:  The patient currently lives at home. TOBACCO:   reports that she has quit smoking. Her smoking use included cigarettes. She has a 40.00 pack-year smoking history. She has been exposed to tobacco smoke. She has never used smokeless tobacco.  ETOH:   reports current alcohol use. Family History:   Positive as follows:        Problem Relation Age of Onset    Heart Disease Sister     Arthritis Sister     Arthritis Brother        REVIEW OF SYSTEMS:       Constitutional: + fever, chills  Respiratory: Negative  for dyspnea, cough   Cardiovascular: Negative for chest pain   Gastrointestinal: Negative for vomiting, diarrhea +nausea, poor appeite  Genitourinary: Negative for hematuria   Musculoskeletal: Negative for arthralgias   Skin: +redness LLE  Neurological: Negative for syncope   Psychiatric/Behavorial: Negative for anxiety    PHYSICAL EXAM:    BP 95/70   Pulse 98   Temp 98.5 °F (36.9 °C) (Oral)   Resp 18   Ht 5' 5\" (1.651 m)   Wt 277 lb (125.6 kg)   SpO2 99%   Breastfeeding No   BMI 46.10 kg/m²     Gen: No distress. Alert. Eyes: PERRL. No sclera icterus. No conjunctival injection. ENT: No discharge. Pharynx clear. Neck: Trachea midline. Resp: No accessory muscle use. No crackles. No wheezes. No rhonchi. CV: Regular rate. Regular rhythm. + murmur. No rub. No edema. GI: Non-tender. Non-distended. Normal bowel sounds. No hernia. Skin: Warm and dry. Erythema, swelling, Left lower extremity, extending up into groin. Tender to palpation  M/S: No cyanosis. No joint deformity. No clubbing. Neuro: Awake. Grossly nonfocal    Psych: Oriented x 3. No anxiety or agitation.      CBC:   Recent Labs     12/19/22  1652 12/20/22  0655   WBC 19.9* 18.0*   HGB 13.1 13.1   HCT 38.5 39.1   MCV 87.7 88.0    168     BMP:   Recent Labs     12/19/22  1652   *   K 4.5   CL 98*   CO2 20*   BUN 27*   CREATININE 1.7* LIVER PROFILE:   Recent Labs     12/19/22  1652   AST 34   ALT 27   BILITOT 0.7   ALKPHOS 114       CULTURES  COVID/flu: not detected  Blood: pending     EKG:  I have reviewed the EKG with the following interpretation:   N/A    RADIOLOGY  No orders to display         Principal Problem:    Cellulitis  Resolved Problems:    * No resolved hospital problems. *        ASSESSMENT/PLAN:  Sepsis, POA  -Leukocytosis, lactic acidosis, fever, BP  -due to Celulitis  -blood cx pending  -Unasyn D#1, IVF's  -trend Lactic, WBC    Cellulitis Left lower extremity   -extending up to groin.  -Unasyn D#1    KRZYSZTOF  -Creatinine on admission 1.7  -baseline ~0.9-0.8  -IVF's added. Monitor BMP  -hold Lasix. DM type 2  -controlled  -monitor glucose. -SSI coverage ordered    Hypertension  BP borderline. Holding Lopressor    Hyperlipidemia  -on Atorvastatin    H/o Aortic Valve replacement  -on Coumadin  Monitor PT/INR    H/o A-fib S/p TAVR  -on Lopressor. Chronic diastolic CHF  -stable. No s/s decompensation  -hold Lasix, cont Aldactone    COPD  -stable. No AE  -Albuterol prn    DVT Prophylaxis: Coumadin. Diet: ADULT DIET;  Regular; 4 carb choices (60 gm/meal)  Code Status: Full Code    Melanie Neeta FNP-C  12/20/2022

## 2022-12-20 NOTE — DISCHARGE INSTRUCTIONS
[facility]  [address]  [place] [address]  [phone]  -------------------------------------  From Dr. Faisal Flores (infectious disease / wound care) --  -- Your clindamycin is 4x per day for 5 days. Penicillin is also 4x per day for 5 days, but then decrease that to 2x daily. Our outpatient pharmacy IS open here today, so you should leave the hospital with pills in hand. -- Keep up with walking, leg elevation, a simple bandage to the left shin, that Medigrip compression sleeve when you can tolerate it, and an occasional dose of Lasix for the swelling is fine, but no more than 2-3 x per week. -- Dressings that you might like at [facility] are called \"Mepilex Border Foam Adhesive\" dressings; very similar to what you have on now. -- My office will call you Tuesday for an appt in early January. We're at [phone], but won't be back in the office until Tuesday morning.     Electronically signed by Zelalem Diaz MD on 12/23/2022 at 8:30 AM  --------------------------------------

## 2022-12-20 NOTE — ACP (ADVANCE CARE PLANNING)
Advance Care Planning     General Advance Care Planning (ACP) Conversation    Date of Conversation: 12/19/2022  Conducted with: Patient with Decision Making Capacity    Healthcare Decision Maker:    Primary Decision Maker: Enmanuel Camacho - Spouse - [phone]  Click here to complete [address] including selection of the Healthcare Decision Maker Relationship (ie \"Primary\"). Today we documented Decision Maker(s) consistent with Legal Next of Kin hierarchy. Content/Action Overview: Has ACP document(s) on file - reflects the patient's care preferences  Reviewed DNR/DNI and patient states she does not want chest compressions, intubation, or cardioversion/defibrillation. Does want resuscitative medications. ROBERT Corley, notified.          Length of Voluntary ACP Conversation in minutes:  <16 minutes (Non-Billable)    Jessica Patrick RN

## 2022-12-20 NOTE — FLOWSHEET NOTE
12/19/22 1958   Vital Signs   Temp 98.8 °F (37.1 °C)   Temp Source Oral   Heart Rate 96   Heart Rate Source Monitor   Resp 18   /61   MAP (Calculated) 77   BP Location Left upper arm   BP Method Automatic   Patient Position Sitting   Level of Consciousness 0   MEWS Score 1   Oxygen Therapy   SpO2 96 %   O2 Device None (Room air)   Height and Weight   Height 5' 5\" (1.651 m)   Weight 277 lb (125.6 kg)   Weight Method Stated   BSA (Calculated - sq m) 2.4 sq meters   BMI (Calculated) 46.2   Admission questions complete at this time per pt report. Assessment complete, see flowsheets. Pt resting in bed at this time, PRN med given per pt request within PRN order parameters and pt aware to call for any needs or changes. Pt declines 4eyes at this time, redness to majority of LLE marked off to keep track of erthythema/calor progress, LLE +1 worse than usual for pt, RLE at baseline for pt of trace edema. Call light within reach. Patient is able to demonstrate the ability to move from a reclining position to an upright position within the recliner. Pt ambulates well independently without use of assistive device. Will continue to monitor.   Heather Osuna RN

## 2022-12-20 NOTE — FLOWSHEET NOTE
12/19/22 1958   Vital Signs   Temp 98.8 °F (37.1 °C)   Temp Source Oral   Heart Rate 96   Heart Rate Source Monitor   Resp 18   BP Location Left upper arm   BP Method Automatic   Patient Position Sitting   Level of Consciousness 0   Oxygen Therapy   SpO2 96 %   O2 Device None (Room air)   Height and Weight   Height 5' 5\" (1.651 m)   Weight 277 lb (125.6 kg)   Weight Method Stated   BSA (Calculated - sq m) 2.4 sq meters   BMI (Calculated) 46.2   Admission questions complete at this time per pt report. Assessment complete, see flowsheets. Pt resting in bed at this time, PRN med given per pt request within PRN order parameters and pt aware to call for any needs or changes. Pt declines 4eyes at this time, redness to majority of LLE marked off to keep track of erthythema/callor progress, LLE +1 worse than usual for pt, RLE at baseline for pt of trace edema. Call light within reach. Patient is able to demonstrate the ability to move from a reclining position to an upright position within the recliner. Pt ambulates well independently without use of assistive device. Will continue to monitor.   Sonia Ramírez RN

## 2022-12-21 ENCOUNTER — APPOINTMENT (OUTPATIENT)
Dept: VASCULAR LAB | Age: 72
End: 2022-12-21
Payer: MEDICARE

## 2022-12-21 PROBLEM — I47.29 NSVT (NONSUSTAINED VENTRICULAR TACHYCARDIA): Status: RESOLVED | Noted: 2019-07-02 | Resolved: 2022-12-21

## 2022-12-21 PROBLEM — R05.3 PERSISTENT COUGH FOR 3 WEEKS OR LONGER: Status: RESOLVED | Noted: 2017-11-08 | Resolved: 2022-12-21

## 2022-12-21 PROBLEM — I47.29 NSVT (NONSUSTAINED VENTRICULAR TACHYCARDIA): Status: ACTIVE | Noted: 2019-07-02

## 2022-12-21 PROBLEM — F41.8 DEPRESSION WITH ANXIETY: Status: RESOLVED | Noted: 2017-02-14 | Resolved: 2022-12-21

## 2022-12-21 PROBLEM — E11.9 TYPE 2 DIABETES MELLITUS WITHOUT COMPLICATION, WITHOUT LONG-TERM CURRENT USE OF INSULIN (HCC): Status: ACTIVE | Noted: 2022-12-21

## 2022-12-21 PROBLEM — G45.3 AMAUROSIS FUGAX: Status: ACTIVE | Noted: 2020-11-20

## 2022-12-21 PROBLEM — L97.821 ULCER OF LEFT SHIN LIMITED TO BREAKDOWN OF SKIN (HCC): Status: ACTIVE | Noted: 2022-12-21

## 2022-12-21 PROBLEM — L03.116 CELLULITIS OF LEFT LOWER EXTREMITY: Status: ACTIVE | Noted: 2022-12-19

## 2022-12-21 PROBLEM — N39.46 MIXED STRESS AND URGE URINARY INCONTINENCE: Status: ACTIVE | Noted: 2017-05-16

## 2022-12-21 PROBLEM — I48.3 TYPICAL ATRIAL FLUTTER (HCC): Status: ACTIVE | Noted: 2019-07-02

## 2022-12-21 PROBLEM — J41.0 SIMPLE CHRONIC BRONCHITIS (HCC): Status: ACTIVE | Noted: 2017-12-11

## 2022-12-21 PROBLEM — Z95.2 S/P TAVR (TRANSCATHETER AORTIC VALVE REPLACEMENT): Status: ACTIVE | Noted: 2019-03-20

## 2022-12-21 PROBLEM — Z95.2 S/P TAVR (TRANSCATHETER AORTIC VALVE REPLACEMENT): Status: RESOLVED | Noted: 2019-03-20 | Resolved: 2022-12-21

## 2022-12-21 PROBLEM — I95.9 HYPOTENSION: Status: RESOLVED | Noted: 2022-12-20 | Resolved: 2022-12-21

## 2022-12-21 PROBLEM — D72.829 LEUKOCYTOSIS: Status: RESOLVED | Noted: 2022-12-20 | Resolved: 2022-12-21

## 2022-12-21 PROBLEM — G45.3 AMAUROSIS FUGAX: Status: RESOLVED | Noted: 2020-11-20 | Resolved: 2022-12-21

## 2022-12-21 PROBLEM — F41.8 DEPRESSION WITH ANXIETY: Status: ACTIVE | Noted: 2017-02-14

## 2022-12-21 PROBLEM — I87.2 VENOUS INSUFFICIENCY (CHRONIC) (PERIPHERAL): Status: ACTIVE | Noted: 2022-12-21

## 2022-12-21 PROBLEM — H25.13 NUCLEAR SCLEROTIC CATARACT OF BOTH EYES: Status: ACTIVE | Noted: 2020-11-20

## 2022-12-21 PROBLEM — L03.119 RECURRENT CELLULITIS OF LOWER EXTREMITY: Status: ACTIVE | Noted: 2022-12-21

## 2022-12-21 PROBLEM — I48.0 PAROXYSMAL ATRIAL FIBRILLATION (HCC): Status: ACTIVE | Noted: 2019-07-02

## 2022-12-21 PROBLEM — E66.9 OBESITY: Status: ACTIVE | Noted: 2022-12-21

## 2022-12-21 PROBLEM — I48.3 TYPICAL ATRIAL FLUTTER (HCC): Status: RESOLVED | Noted: 2019-07-02 | Resolved: 2022-12-21

## 2022-12-21 PROBLEM — I87.2 VENOUS INSUFFICIENCY (CHRONIC) (PERIPHERAL): Status: RESOLVED | Noted: 2022-12-21 | Resolved: 2022-12-21

## 2022-12-21 LAB
ANION GAP SERPL CALCULATED.3IONS-SCNC: 11 MMOL/L (ref 3–16)
BASOPHILS ABSOLUTE: 0 K/UL (ref 0–0.2)
BASOPHILS RELATIVE PERCENT: 0.3 %
BUN BLDV-MCNC: 14 MG/DL (ref 7–20)
CALCIUM SERPL-MCNC: 8 MG/DL (ref 8.3–10.6)
CHLORIDE BLD-SCNC: 103 MMOL/L (ref 99–110)
CO2: 23 MMOL/L (ref 21–32)
CREAT SERPL-MCNC: 0.7 MG/DL (ref 0.6–1.2)
EOSINOPHILS ABSOLUTE: 0.1 K/UL (ref 0–0.6)
EOSINOPHILS RELATIVE PERCENT: 0.9 %
GFR SERPL CREATININE-BSD FRML MDRD: >60 ML/MIN/{1.73_M2}
GLUCOSE BLD-MCNC: 131 MG/DL (ref 70–99)
GLUCOSE BLD-MCNC: 137 MG/DL (ref 70–99)
GLUCOSE BLD-MCNC: 78 MG/DL (ref 70–99)
GLUCOSE BLD-MCNC: 80 MG/DL (ref 70–99)
GLUCOSE BLD-MCNC: 94 MG/DL (ref 70–99)
HCT VFR BLD CALC: 34.8 % (ref 36–48)
HEMOGLOBIN: 11.8 G/DL (ref 12–16)
INR BLD: 2 (ref 0.87–1.14)
LACTIC ACID: 1.1 MMOL/L (ref 0.4–2)
LYMPHOCYTES ABSOLUTE: 1.1 K/UL (ref 1–5.1)
LYMPHOCYTES RELATIVE PERCENT: 9.3 %
MCH RBC QN AUTO: 29.9 PG (ref 26–34)
MCHC RBC AUTO-ENTMCNC: 34 G/DL (ref 31–36)
MCV RBC AUTO: 88.1 FL (ref 80–100)
MONOCYTES ABSOLUTE: 1 K/UL (ref 0–1.3)
MONOCYTES RELATIVE PERCENT: 8.3 %
NEUTROPHILS ABSOLUTE: 9.5 K/UL (ref 1.7–7.7)
NEUTROPHILS RELATIVE PERCENT: 81.2 %
PDW BLD-RTO: 15 % (ref 12.4–15.4)
PERFORMED ON: ABNORMAL
PERFORMED ON: ABNORMAL
PERFORMED ON: NORMAL
PERFORMED ON: NORMAL
PLATELET # BLD: 167 K/UL (ref 135–450)
PMV BLD AUTO: 9.7 FL (ref 5–10.5)
POTASSIUM REFLEX MAGNESIUM: 3.9 MMOL/L (ref 3.5–5.1)
PROTHROMBIN TIME: 22.5 SEC (ref 11.7–14.5)
RBC # BLD: 3.95 M/UL (ref 4–5.2)
SODIUM BLD-SCNC: 137 MMOL/L (ref 136–145)
WBC # BLD: 11.7 K/UL (ref 4–11)

## 2022-12-21 PROCEDURE — 36415 COLL VENOUS BLD VENIPUNCTURE: CPT

## 2022-12-21 PROCEDURE — 6370000000 HC RX 637 (ALT 250 FOR IP): Performed by: NURSE PRACTITIONER

## 2022-12-21 PROCEDURE — 6360000002 HC RX W HCPCS: Performed by: INTERNAL MEDICINE

## 2022-12-21 PROCEDURE — 99221 1ST HOSP IP/OBS SF/LOW 40: CPT | Performed by: INTERNAL MEDICINE

## 2022-12-21 PROCEDURE — 2580000003 HC RX 258: Performed by: INTERNAL MEDICINE

## 2022-12-21 PROCEDURE — 2580000003 HC RX 258: Performed by: NURSE PRACTITIONER

## 2022-12-21 PROCEDURE — 1200000000 HC SEMI PRIVATE

## 2022-12-21 PROCEDURE — 6370000000 HC RX 637 (ALT 250 FOR IP): Performed by: INTERNAL MEDICINE

## 2022-12-21 PROCEDURE — 80048 BASIC METABOLIC PNL TOTAL CA: CPT

## 2022-12-21 PROCEDURE — 83605 ASSAY OF LACTIC ACID: CPT

## 2022-12-21 PROCEDURE — 85610 PROTHROMBIN TIME: CPT

## 2022-12-21 PROCEDURE — 93970 EXTREMITY STUDY: CPT

## 2022-12-21 PROCEDURE — 85025 COMPLETE CBC W/AUTO DIFF WBC: CPT

## 2022-12-21 RX ORDER — WARFARIN SODIUM 7.5 MG/1
7.5 TABLET ORAL
Status: COMPLETED | OUTPATIENT
Start: 2022-12-21 | End: 2022-12-21

## 2022-12-21 RX ORDER — ENOXAPARIN SODIUM 150 MG/ML
1 INJECTION SUBCUTANEOUS 2 TIMES DAILY
Status: DISCONTINUED | OUTPATIENT
Start: 2022-12-21 | End: 2022-12-23 | Stop reason: HOSPADM

## 2022-12-21 RX ADMIN — SODIUM CHLORIDE, PRESERVATIVE FREE 10 ML: 5 INJECTION INTRAVENOUS at 21:49

## 2022-12-21 RX ADMIN — ASPIRIN 81 MG: 81 TABLET, CHEWABLE ORAL at 08:34

## 2022-12-21 RX ADMIN — ATORVASTATIN CALCIUM 40 MG: 40 TABLET, FILM COATED ORAL at 21:48

## 2022-12-21 RX ADMIN — WARFARIN SODIUM 7.5 MG: 7.5 TABLET ORAL at 19:28

## 2022-12-21 RX ADMIN — AMPICILLIN SODIUM AND SULBACTAM SODIUM 3000 MG: 2; 1 INJECTION, POWDER, FOR SOLUTION INTRAMUSCULAR; INTRAVENOUS at 05:41

## 2022-12-21 RX ADMIN — ENOXAPARIN SODIUM 105 MG: 150 INJECTION SUBCUTANEOUS at 13:43

## 2022-12-21 RX ADMIN — ACETAMINOPHEN 650 MG: 325 TABLET ORAL at 04:01

## 2022-12-21 RX ADMIN — AMPICILLIN SODIUM AND SULBACTAM SODIUM 3000 MG: 2; 1 INJECTION, POWDER, FOR SOLUTION INTRAMUSCULAR; INTRAVENOUS at 16:18

## 2022-12-21 RX ADMIN — ENOXAPARIN SODIUM 105 MG: 150 INJECTION SUBCUTANEOUS at 21:25

## 2022-12-21 RX ADMIN — AMPICILLIN SODIUM AND SULBACTAM SODIUM 3000 MG: 2; 1 INJECTION, POWDER, FOR SOLUTION INTRAMUSCULAR; INTRAVENOUS at 22:39

## 2022-12-21 RX ADMIN — SPIRONOLACTONE 50 MG: 25 TABLET ORAL at 08:35

## 2022-12-21 RX ADMIN — SODIUM CHLORIDE: 9 INJECTION, SOLUTION INTRAVENOUS at 03:01

## 2022-12-21 ASSESSMENT — PAIN SCALES - GENERAL: PAINLEVEL_OUTOF10: 6

## 2022-12-21 ASSESSMENT — PAIN DESCRIPTION - ORIENTATION: ORIENTATION: RIGHT

## 2022-12-21 ASSESSMENT — PAIN DESCRIPTION - LOCATION: LOCATION: LEG

## 2022-12-21 NOTE — PROGRESS NOTES
Consult has been called to Dr. Viri Morley on 12/21/22. Spoke with dr Viri Morley via perfect serve.  2:18 PM    Randi Yeager  12/21/2022

## 2022-12-21 NOTE — PLAN OF CARE
Problem: ABCDS Injury Assessment  Goal: Absence of physical injury  Outcome: Progressing     Problem: Skin/Tissue Integrity  Goal: Absence of new skin breakdown  Description: 1. Monitor for areas of redness and/or skin breakdown  2. Assess vascular access sites hourly  3. Every 4-6 hours minimum:  Change oxygen saturation probe site  4. Every 4-6 hours:  If on nasal continuous positive airway pressure, respiratory therapy assess nares and determine need for appliance change or resting period.   Outcome: Progressing     Problem: Pain  Goal: Verbalizes/displays adequate comfort level or baseline comfort level  12/20/2022 2249 by Naveed Garcia RN  Flowsheets (Taken 12/20/2022 2249)  Verbalizes/displays adequate comfort level or baseline comfort level:   Encourage patient to monitor pain and request assistance   Assess pain using appropriate pain scale  12/20/2022 1826 by Sotero Haddad RN  Flowsheets (Taken 12/20/2022 1826)  Verbalizes/displays adequate comfort level or baseline comfort level: Encourage patient to monitor pain and request assistance

## 2022-12-21 NOTE — CONSULTS
[facility] Infectious Disease Consult Note      Hilda Deleon     : 1950    DATE OF VISIT:  2022  DATE OF ADMISSION:  2022       Subjective:     Hilda Deleon is a 67 y.o. female whom I've been asked to see by Dr. Aden Talavera for recurrent cellulitis. Chief Complaint   Patient presents with    Leg Swelling     Left lower leg red and swollen; pt states she has chronic cellulitis to left leg; increased redness and swelling x 4 days       HPI:  Ms. Nina Chavira has a Hx of obesity, COPD, heart disease, a TAVR, and BL knee replacements. Not long after her TAVR a few years ago she developed a severe (bullous, with sepsis syndrome) LLE cellulitis. It sounds like her occasional lower extremity edema was typically worse in that left leg, as her left TKR was a bit more traumatic and complicated than her right TKR. Cultures were negative at that time, but it sounds like the clinical illness was c/w Strep disease, and as severe as the infection was, she was placed on chronic suppressive oral PCN after that. One time since then, despite the oral PCN, she had a milder LLE cellulitis, was quickly treated with IV therapy for a short time. Her ID physician has since retired, and she wasn't able to find a new one as of yet, wasn't able to get her PCN renewed by her PCP, has been off it for about a week, and came to the ER a few days ago with pretty rapid onset of left hip / inguinal discomfort, followed by left lower leg swelling, redness, pain, shaking chills, fever, malaise, weakness. She did recently have a small traumatic wound to the left shin, from working outside. Admitted here because of the severity and acuity of disease. Started on IV Abx after cultures and labs and basic imaging were done (Unasyn). Cultures negative, she feels rather improved from a few days ago, and I was asked for comments on ongoing Rx.  Tolerating ABx well, no sore throat or mouth, rash or pruritus, IV site pain, N/V/D. Ms. Demario Cary has a past medical history of KRZYSZTOF (acute kidney injury) ([facility] [address] Amaurosis fugax, Depression with anxiety, Environmental allergies, Nonrheumatic aortic valve stenosis, NSVT (nonsustained ventricular tachycardia), Osteoarthritis of knees, bilateral, Tobacco use disorder, Typical atrial flutter ([address] and Vocal cord polyp. She has a past surgical history that includes Colposcopy; laryngoscopy (); Knee arthroscopy (Right); nasal endoscopy (2012); Total knee arthroplasty (Right, 2012); Total knee arthroplasty (Left, 08/15/2012); Aortic valve replacement (2019);  section (); lumbar laminectomy (); and Cardiac catheterization (2018). Her family history includes Arthritis in her brother and sister; Heart Disease in her sister. Ms. Demario Cary reports that she has quit smoking. Her smoking use included cigarettes. She has a 40.00 pack-year smoking history. She has been exposed to tobacco smoke. She has never used smokeless tobacco. She reports current alcohol use. She reports that she does not use drugs.     Current Facility-Administered Medications: warfarin (COUMADIN) tablet 7.5 mg, 7.5 mg, Oral, Once  enoxaparin (LOVENOX) injection 105 mg, 1 mg/kg, SubCUTAneous, BID  albuterol sulfate HFA (PROVENTIL;VENTOLIN;PROAIR) 108 (90 Base) MCG/ACT inhaler 2 puff, 2 puff, Inhalation, Q4H PRN  aspirin chewable tablet 81 mg, 81 mg, Oral, Daily  warfarin placeholder: dosing by pharmacy, , Other, RX Placeholder  hydrOXYzine HCl (ATARAX) tablet 10 mg, 10 mg, Oral, TID PRN  spironolactone (ALDACTONE) tablet 50 mg, 50 mg, Oral, Daily  glucose chewable tablet 16 g, 4 tablet, Oral, PRN  dextrose bolus 10% 125 mL, 125 mL, IntraVENous, PRN **OR** dextrose bolus 10% 250 mL, 250 mL, IntraVENous, PRN  glucagon (rDNA) injection 1 mg, 1 mg, SubCUTAneous, PRN  dextrose 10 % infusion, , IntraVENous, Continuous PRN  sodium chloride flush 0.9 % injection 5-40 mL, 5-40 mL, IntraVENous, 2 times per day  sodium chloride flush 0.9 % injection 5-40 mL, 5-40 mL, IntraVENous, PRN  0.9 % sodium chloride infusion, , IntraVENous, PRN  ondansetron (ZOFRAN-ODT) disintegrating tablet 4 mg, 4 mg, Oral, Q8H PRN **OR** ondansetron (ZOFRAN) injection 4 mg, 4 mg, IntraVENous, Q6H PRN  polyethylene glycol (GLYCOLAX) packet 17 g, 17 g, Oral, Daily PRN  acetaminophen (TYLENOL) tablet 650 mg, 650 mg, Oral, Q6H PRN **OR** acetaminophen (TYLENOL) suppository 650 mg, 650 mg, Rectal, Q6H PRN  ampicillin-sulbactam (UNASYN) 3,000 mg in sodium chloride 0.9 % 100 mL IVPB (mini-bag), 3,000 mg, IntraVENous, Q6H  insulin lispro (HUMALOG) injection vial 0-4 Units, 0-4 Units, SubCUTAneous, TID WC  insulin lispro (HUMALOG) injection vial 0-4 Units, 0-4 Units, SubCUTAneous, Nightly  atorvastatin (LIPITOR) tablet 40 mg, 40 mg, Oral, Nightly     Abx this admission --  -- Ancef Dec 19 x 1 dose. -- Unasyn Dec 20 - present. Allergies: Ace inhibitors, Lisinopril, Mobic, and Morphine    Pertinent items from the review of systems are discussed in the HPI; the remainder of the ROS was reviewed and is negative.      Objective:     Vital signs over the last 24 hours:  Temp  Av °F (36.7 °C)  Min: 97.7 °F (36.5 °C)  Max: 98.6 °F (37 °C)  Pulse  Av  Min: 76  Max: 83  Systolic (35HDP), HPR:802 , Min:121 , IPA:845   Diastolic (60KGL), KDM:60, Min:61, Max:74  Resp  Av.7  Min: 18  Max: 20  SpO2  Av.7 %  Min: 94 %  Max: 99 %    Constitutional:  well-developed, well-nourished, NAD, conversant, just a bit uncomfortable  Psychiatric:  oriented to person, place and time; mood and affect appropriate for the situation   Eyes:  pupils equal, round and reactive to light; sclerae anicteric, conjunctivae not pale  ENT:  atraumatic; oral mucosa moist, no thrush or ulcers  Resp:  lungs clear to auscultation BL; no use of accessory muscles or other signs of resp distress  Cardiovascular:  heart regular, no gallop, no murmur; very mild right and moderate left lower extremity lymphedema - stage 2, no trophic changes, but a positive Stemmers sign; no IV phlebitis  GI:  abdomen soft, NT, ND, normal bowel sounds, no palpable masses or organomegaly  Lymph: small but now nontender left inguinal nodes, a bit of a patch of left medial thigh lymphangitis, and then an ongoing left lower leg cellulitis (red, warm, indurated, tender), no fluctuance  Musculoskeletal:  no clubbing, cyanosis or petechiae; extremities with no gross effusions, joint misalignment or acute arthritis  Skin: warm, dry, normal turgor; no drug rash; small partial thickness wound on left shin, a bit of fibrinous debris and mild epidermal necrosis  ______________________________    Recent Labs     12/21/22  0614 12/20/22  0655 12/19/22  1652   WBC 11.7* 18.0* 19.9*   HGB 11.8* 13.1 13.1   HCT 34.8* 39.1 38.5   MCV 88.1 88.0 87.7    168 161     Lab Results   Component Value Date    CREATININE 0.7 12/21/2022     Lab Results   Component Value Date    LABALBU 3.5 12/19/2022     Lab Results   Component Value Date    ALT 27 12/19/2022    AST 34 12/19/2022    ALKPHOS 114 12/19/2022    BILITOT 0.7 12/19/2022      Lab Results   Component Value Date    LABA1C 6.2 10/05/2016     Other recent pertinent labs: Anion gap from 15 to 11. Lactate from 2.2 to 1.1. ANC from 18.5k to 16.1k to 9.5k.   ______________________________    Recent pertinent micro results:  Two BCx negative. ______________________________    Recent imaging results (last 7 days):     LLE venous Doppler from today -- Within the limits of this exam, there is no evidence of deep or superficial venous thrombosis in the lower extremities bilaterally.     Assessment:     Patient Active Problem List   Diagnosis Code    Chronic leg pain M79.606, G89.29    Lymphedema of left lower extremity I89.0    Hypertension I10    Primary osteoarthritis involving multiple joints M15.9    Heart murmur R01.1    Cellulitis of left lower extremity L03.116    KRZYSZTOF (acute kidney injury) ([facility] [address] N17.9    Sepsis with acute renal failure without septic shock ([facility]) A41.9, R65.20, N17.9    Ulcer of left shin limited to breakdown of skin ([facility] [address] L97.821    Recurrent cellulitis of lower extremity L03.119    Obesity E66.9    Degeneration of intervertebral disc, site unspecified [id number]    Mixed hyperlipidemia E78.2    Mixed stress and urge urinary incontinence N39.46    Nuclear sclerotic cataract of both eyes H25.13    Paroxysmal atrial fibrillation ([facility]) I48.0    Reflux esophagitis K21.00    Simple chronic bronchitis ([facility]) J41.0    Type 2 diabetes mellitus without complication, without long-term current use of insulin ([facility]) E11.9     Assessment of today's active condition(s):     -- Background of obesity, COPD, heart disease, well-controlled DM. -- LLE lymphedema related to prior TKR, perhaps some venous disease, and then also that one severe episode of cellulitis a few years ago, which certainly did sound like Strep infection. More or less successful suppression with oral PCN for a few years, but then within a week or so of stopping PCN, an acute relapse in LLE cellulitis, again likely Strep. For high-inoculum disease, I sometimes combine a beta-lactam with clinda to get patients over the initial phase of things, but it sounds like she's improved a fair amount in the last 48 hours with the Unasyn alone. Treatment recs: For tonight --  -- Continue Unasyn. -- PSO and a foam border to the left choi. -- Single \"F\" Linnell Shells for a bit of compression. -- Encouraged her to walk, but then also elevate when at rest.    For tomorrow --  -- Hopefully discharge home on orals, if her leg is doing better. Maybe Augmentin, or cefadroxil, etc. If she isn't improving, we can always consider adding clinda for a day or two, or just delaying discharge for another day or two of IV therapy if needed.     For the longer-term --  -- I can get her back on chronic suppressive oral PCN. -- And then we can discuss some options for long-term LLE compression (she's tried and not done well with compression stockings in the past); she currently just takes a PRN dose of Lasix when her swelling increases, but I think a combo of skin care, compression and oral PCN would do better for her. I'd prefer a Velcro compression garment if she's able; alternative plan would be Medigrip or similar; and then perhaps a compression pump in the future if her LLE lymphedema is refractory to the above.     Electronically signed by Marcin Diallo MD on 12/21/2022 at 6:26 PM.

## 2022-12-21 NOTE — FLOWSHEET NOTE
12/20/22 1953   Vital Signs   Temp 97.8 °F (36.6 °C)   Temp Source Oral   Heart Rate 83   Heart Rate Source Monitor   Resp 18   /74   MAP (Calculated) 93   BP Location Left upper arm   BP Method Automatic   Patient Position Up in chair   Level of Consciousness 0   MEWS Score 1   Oxygen Therapy   SpO2 94 %   O2 Device None (Room air)   Pt resting in bed at this time, alert and oriented x 4. Assessment completed and redness noted to LLE due to cellulitis. LLE warm to touch and pt now reports that its itching. She states she is going to need \"a Benadryl for this itching,\" referring to her leg. Nurse to message MD regarding need for medication. Perfect serve sent to Dr. Gareth Lam.

## 2022-12-21 NOTE — CARE COORDINATION
Pt is from home with spouse and plans to return. CM is following pt for possible IV abx.      CM will continue to follow

## 2022-12-21 NOTE — PROGRESS NOTES
Received new order for Hydroxyzine for itching. Education provided to patient about medication. Pt accepted PRN medication.

## 2022-12-21 NOTE — PROGRESS NOTES
Pharmacy Note  Warfarin Consult  Dx: AVR  Goal INR range  2.5-3.5 (per consult note)  Home Warfarin dose:5mg daily  New start Warfarin with Heparin/Enoxaparin bridge. Would recommend continue bridge until INR therapeutic. Added Lovenox 1mg/kg q12h per Dr Montse Curtis    Date  INR  Warfarin  12/20             2.13                   7mg  12/21             2.00                   7.5mg  Recommend Warfarin 7.5mg mg tonight x1. Daily INR ordered. Rx will continue to manage therapy per consult order. Candida Hidden Pharm D 12/21/202212:56 PM  .      .

## 2022-12-22 LAB
ANION GAP SERPL CALCULATED.3IONS-SCNC: 12 MMOL/L (ref 3–16)
BASOPHILS ABSOLUTE: 0.1 K/UL (ref 0–0.2)
BASOPHILS RELATIVE PERCENT: 0.7 %
BUN BLDV-MCNC: 9 MG/DL (ref 7–20)
CALCIUM SERPL-MCNC: 8.1 MG/DL (ref 8.3–10.6)
CHLORIDE BLD-SCNC: 104 MMOL/L (ref 99–110)
CO2: 24 MMOL/L (ref 21–32)
CREAT SERPL-MCNC: 0.7 MG/DL (ref 0.6–1.2)
EOSINOPHILS ABSOLUTE: 0.4 K/UL (ref 0–0.6)
EOSINOPHILS RELATIVE PERCENT: 5.4 %
GFR SERPL CREATININE-BSD FRML MDRD: >60 ML/MIN/{1.73_M2}
GLUCOSE BLD-MCNC: 104 MG/DL (ref 70–99)
GLUCOSE BLD-MCNC: 116 MG/DL (ref 70–99)
GLUCOSE BLD-MCNC: 141 MG/DL (ref 70–99)
GLUCOSE BLD-MCNC: 84 MG/DL (ref 70–99)
GLUCOSE BLD-MCNC: 91 MG/DL (ref 70–99)
HCT VFR BLD CALC: 36.4 % (ref 36–48)
HEMOGLOBIN: 12.4 G/DL (ref 12–16)
INR BLD: 2.14 (ref 0.87–1.14)
LYMPHOCYTES ABSOLUTE: 1.9 K/UL (ref 1–5.1)
LYMPHOCYTES RELATIVE PERCENT: 25 %
MCH RBC QN AUTO: 29.9 PG (ref 26–34)
MCHC RBC AUTO-ENTMCNC: 34.1 G/DL (ref 31–36)
MCV RBC AUTO: 87.7 FL (ref 80–100)
MONOCYTES ABSOLUTE: 1.1 K/UL (ref 0–1.3)
MONOCYTES RELATIVE PERCENT: 15.1 %
NEUTROPHILS ABSOLUTE: 4 K/UL (ref 1.7–7.7)
NEUTROPHILS RELATIVE PERCENT: 53.8 %
PDW BLD-RTO: 15.3 % (ref 12.4–15.4)
PERFORMED ON: ABNORMAL
PERFORMED ON: NORMAL
PLATELET # BLD: 172 K/UL (ref 135–450)
PMV BLD AUTO: 9.5 FL (ref 5–10.5)
POTASSIUM REFLEX MAGNESIUM: 3.6 MMOL/L (ref 3.5–5.1)
PROTHROMBIN TIME: 23.7 SEC (ref 11.7–14.5)
RBC # BLD: 4.15 M/UL (ref 4–5.2)
SODIUM BLD-SCNC: 140 MMOL/L (ref 136–145)
WBC # BLD: 7.4 K/UL (ref 4–11)

## 2022-12-22 PROCEDURE — 99232 SBSQ HOSP IP/OBS MODERATE 35: CPT | Performed by: INTERNAL MEDICINE

## 2022-12-22 PROCEDURE — 6370000000 HC RX 637 (ALT 250 FOR IP): Performed by: INTERNAL MEDICINE

## 2022-12-22 PROCEDURE — 6360000002 HC RX W HCPCS: Performed by: INTERNAL MEDICINE

## 2022-12-22 PROCEDURE — 85025 COMPLETE CBC W/AUTO DIFF WBC: CPT

## 2022-12-22 PROCEDURE — 85610 PROTHROMBIN TIME: CPT

## 2022-12-22 PROCEDURE — 80048 BASIC METABOLIC PNL TOTAL CA: CPT

## 2022-12-22 PROCEDURE — 2500000003 HC RX 250 WO HCPCS: Performed by: INTERNAL MEDICINE

## 2022-12-22 PROCEDURE — 36415 COLL VENOUS BLD VENIPUNCTURE: CPT

## 2022-12-22 PROCEDURE — 2580000003 HC RX 258: Performed by: INTERNAL MEDICINE

## 2022-12-22 PROCEDURE — 1200000000 HC SEMI PRIVATE

## 2022-12-22 PROCEDURE — 6370000000 HC RX 637 (ALT 250 FOR IP): Performed by: NURSE PRACTITIONER

## 2022-12-22 RX ORDER — TRAMADOL HYDROCHLORIDE 50 MG/1
50 TABLET ORAL EVERY 6 HOURS PRN
Status: DISCONTINUED | OUTPATIENT
Start: 2022-12-22 | End: 2022-12-23 | Stop reason: HOSPADM

## 2022-12-22 RX ORDER — WARFARIN SODIUM 7.5 MG/1
7.5 TABLET ORAL
Status: COMPLETED | OUTPATIENT
Start: 2022-12-22 | End: 2022-12-22

## 2022-12-22 RX ORDER — CLINDAMYCIN PHOSPHATE 600 MG/50ML
600 INJECTION INTRAVENOUS EVERY 8 HOURS
Status: DISCONTINUED | OUTPATIENT
Start: 2022-12-22 | End: 2022-12-23 | Stop reason: HOSPADM

## 2022-12-22 RX ADMIN — ASPIRIN 81 MG: 81 TABLET, CHEWABLE ORAL at 07:55

## 2022-12-22 RX ADMIN — AMPICILLIN SODIUM AND SULBACTAM SODIUM 3000 MG: 2; 1 INJECTION, POWDER, FOR SOLUTION INTRAMUSCULAR; INTRAVENOUS at 11:06

## 2022-12-22 RX ADMIN — AMPICILLIN SODIUM AND SULBACTAM SODIUM 3000 MG: 2; 1 INJECTION, POWDER, FOR SOLUTION INTRAMUSCULAR; INTRAVENOUS at 05:00

## 2022-12-22 RX ADMIN — CLINDAMYCIN IN 5 PERCENT DEXTROSE 600 MG: 12 INJECTION, SOLUTION INTRAVENOUS at 18:23

## 2022-12-22 RX ADMIN — AMPICILLIN SODIUM AND SULBACTAM SODIUM 3000 MG: 2; 1 INJECTION, POWDER, FOR SOLUTION INTRAMUSCULAR; INTRAVENOUS at 22:20

## 2022-12-22 RX ADMIN — ATORVASTATIN CALCIUM 40 MG: 40 TABLET, FILM COATED ORAL at 20:01

## 2022-12-22 RX ADMIN — ENOXAPARIN SODIUM 105 MG: 150 INJECTION SUBCUTANEOUS at 07:56

## 2022-12-22 RX ADMIN — CLINDAMYCIN IN 5 PERCENT DEXTROSE 600 MG: 12 INJECTION, SOLUTION INTRAVENOUS at 09:50

## 2022-12-22 RX ADMIN — TRAMADOL HYDROCHLORIDE 50 MG: 50 TABLET ORAL at 22:15

## 2022-12-22 RX ADMIN — WARFARIN SODIUM 7.5 MG: 7.5 TABLET ORAL at 18:23

## 2022-12-22 RX ADMIN — SPIRONOLACTONE 50 MG: 25 TABLET ORAL at 07:55

## 2022-12-22 RX ADMIN — ENOXAPARIN SODIUM 105 MG: 150 INJECTION SUBCUTANEOUS at 20:01

## 2022-12-22 RX ADMIN — AMPICILLIN SODIUM AND SULBACTAM SODIUM 3000 MG: 2; 1 INJECTION, POWDER, FOR SOLUTION INTRAMUSCULAR; INTRAVENOUS at 16:54

## 2022-12-22 RX ADMIN — SODIUM CHLORIDE, PRESERVATIVE FREE 10 ML: 5 INJECTION INTRAVENOUS at 20:02

## 2022-12-22 ASSESSMENT — PAIN SCALES - GENERAL
PAINLEVEL_OUTOF10: 6
PAINLEVEL_OUTOF10: 6

## 2022-12-22 ASSESSMENT — PAIN DESCRIPTION - ORIENTATION: ORIENTATION: LEFT

## 2022-12-22 ASSESSMENT — PAIN DESCRIPTION - FREQUENCY: FREQUENCY: CONTINUOUS

## 2022-12-22 ASSESSMENT — PAIN DESCRIPTION - ONSET: ONSET: ON-GOING

## 2022-12-22 ASSESSMENT — PAIN DESCRIPTION - PAIN TYPE: TYPE: ACUTE PAIN

## 2022-12-22 ASSESSMENT — PAIN DESCRIPTION - DESCRIPTORS: DESCRIPTORS: STABBING

## 2022-12-22 ASSESSMENT — PAIN - FUNCTIONAL ASSESSMENT: PAIN_FUNCTIONAL_ASSESSMENT: ACTIVITIES ARE NOT PREVENTED

## 2022-12-22 ASSESSMENT — PAIN DESCRIPTION - LOCATION: LOCATION: LEG

## 2022-12-22 NOTE — PROGRESS NOTES
Pharmacy Note  Warfarin Consult  Dx: AVR  Goal INR range  2.5-3.5 (per consult note)  Home Warfarin dose:5mg daily  New start Warfarin with Heparin/Enoxaparin bridge. Would recommend continue bridge until INR therapeutic. Date  INR  Warfarin  12/20             2.13                   7mg  12/21             2.00                   7.5mg  12/22             2.14                   7.5mg  Recommend Warfarin 7.5mg mg tonight x1. Daily INR ordered. Rx will continue to manage therapy per consult order. Carolann Stock Pharm D 12/22/202211:49 AM  .      . Mily Meza

## 2022-12-22 NOTE — CARE COORDINATION
INTERDISCIPLINARY PLAN OF CARE CONFERENCE    Date/Time: 12/22/2022 3:11 PM  Completed by: JOSY Monsivais  Case Management      Patient Name:  Power Diana  YOB: 1950  Admitting Diagnosis: Cellulitis [L03.90]  Cellulitis of left lower extremity [L03.116]     Admit Date/Time:  12/19/2022  4:12 PM    Chart reviewed. Interdisciplinary team contacted or reviewed plan related to patient progress and discharge plans. Disciplines included Case Management, Nursing, and Dietitian. Current Status:ongoing   PT/OT recommendation for discharge plan of care: n/a    Expected D/C Disposition:  Home  Confirmed plan with patient and/or family Yes confirmed with: (name) pt  Met with:pt    Discharge Plan Comments: Chart review completed. Met with pt at bedside. Pt confirmed she will return home when discharged. Discussed skilled home care for RN/PT/OT and she declined stating she won't need this. Spoke with RN who states pt will discharge on oral ABX when able.      Per note from Dr. Francisco Rawls on 12/21, the goal is oral ABX when discharged (see his note)    Home O2 in place on admit: No

## 2022-12-22 NOTE — PROGRESS NOTES
[facility] Infectious Disease Progress Note      Honey Santos     : 1950    DATE OF VISIT:  2022  DATE OF ADMISSION:  2022       Subjective:     Honey Santos is a 67 y.o. female whom I've been seeing for a recurrent LLE cellulitis. Since I last saw her, she's remained afebrile, has tolerated Abx well, went through some walking and elevation last night, kept the [address] [place] [address] in place until very early this AM, when she was having more pain. OOB to chair now, but leg still elevated. No sore throat, rash, IV site pain, N/V/D. Ms. Sofia Mckeon has a past medical history of KRZYSZTOF (acute kidney injury) ([facility] [address] Amaurosis fugax, Depression with anxiety, Environmental allergies, Nonrheumatic aortic valve stenosis, NSVT (nonsustained ventricular tachycardia), Osteoarthritis of knees, bilateral, Tobacco use disorder, Typical atrial flutter ([facility] [address] and Vocal cord polyp.     Current Facility-Administered Medications: enoxaparin (LOVENOX) injection 105 mg, 1 mg/kg, SubCUTAneous, BID  albuterol sulfate HFA (PROVENTIL;VENTOLIN;PROAIR) 108 (90 Base) MCG/ACT inhaler 2 puff, 2 puff, Inhalation, Q4H PRN  aspirin chewable tablet 81 mg, 81 mg, Oral, Daily  warfarin placeholder: dosing by pharmacy, , Other, RX Placeholder  hydrOXYzine HCl (ATARAX) tablet 10 mg, 10 mg, Oral, TID PRN  spironolactone (ALDACTONE) tablet 50 mg, 50 mg, Oral, Daily  glucose chewable tablet 16 g, 4 tablet, Oral, PRN  dextrose bolus 10% 125 mL, 125 mL, IntraVENous, PRN **OR** dextrose bolus 10% 250 mL, 250 mL, IntraVENous, PRN  glucagon (rDNA) injection 1 mg, 1 mg, SubCUTAneous, PRN  dextrose 10 % infusion, , IntraVENous, Continuous PRN  sodium chloride flush 0.9 % injection 5-40 mL, 5-40 mL, IntraVENous, 2 times per day  sodium chloride flush 0.9 % injection 5-40 mL, 5-40 mL, IntraVENous, PRN  0.9 % sodium chloride infusion, , IntraVENous, PRN  ondansetron (ZOFRAN-ODT) disintegrating tablet 4 mg, 4 mg, Oral, Q8H PRN **OR** ondansetron (ZOFRAN) injection 4 mg, 4 mg, IntraVENous, Q6H PRN  polyethylene glycol (GLYCOLAX) packet 17 g, 17 g, Oral, Daily PRN  acetaminophen (TYLENOL) tablet 650 mg, 650 mg, Oral, Q6H PRN **OR** acetaminophen (TYLENOL) suppository 650 mg, 650 mg, Rectal, Q6H PRN  ampicillin-sulbactam (UNASYN) 3,000 mg in sodium chloride 0.9 % 100 mL IVPB (mini-bag), 3,000 mg, IntraVENous, Q6H  insulin lispro (HUMALOG) injection vial 0-4 Units, 0-4 Units, SubCUTAneous, TID WC  insulin lispro (HUMALOG) injection vial 0-4 Units, 0-4 Units, SubCUTAneous, Nightly  atorvastatin (LIPITOR) tablet 40 mg, 40 mg, Oral, Nightly     This is day 3 of Unasyn. Allergies: Ace inhibitors, Lisinopril, Mobic, and Morphine    Pertinent items from the review of systems are discussed in the HPI; the remainder of the ROS was reviewed and is negative.      Objective:     Vital signs over the last 24 hours:  Temp  Av.7 °F (36.5 °C)  Min: 97.1 °F (36.2 °C)  Max: 98.3 °F (36.8 °C)  Pulse  Av.8  Min: 69  Max: 81  Systolic (50XSB), GHU:829 , Min:121 , FNP:628   Diastolic (56PMQ), TJC:27, Min:60, Max:82  Resp  Av  Min: 18  Max: 18  SpO2  Av.4 %  Min: 97 %  Max: 100 %    Constitutional:  well-developed, well-nourished, NAD, conversant, a bit uncomfortable  Psychiatric:  oriented to person, place and time; mood and affect appropriate for the situation   ENT:  atraumatic; oral mucosa moist, no thrush or ulcers  Cardiovascular:  heart regular, no gallop, no murmur; very mild right and moderate left lower extremity lymphedema (maybe a bit worse than yesterday) -- stage 2, no trophic changes, but a positive Stemmers sign; no IV phlebitis  GI:  abdomen soft, NT, ND, normal bowel sounds, no palpable masses or organomegaly  Lymph: small but now nontender left inguinal nodes, and unfortunately I think the patches of thigh angitis / cellulitis look a bit more extensive today, and the lower leg cellulitis itself might be deeper red, still rather tender; no fluctuance  Musculoskeletal:  no clubbing, cyanosis or petechiae; extremities with no gross effusions, joint misalignment or acute arthritis  Skin: warm, dry, normal turgor; no drug rash; left shin ulcer not examined today.  ______________________________    Recent Labs     12/22/22  0533 12/21/22  0614 12/20/22  0655   WBC 7.4 11.7* 18.0*   HGB 12.4 11.8* 13.1   HCT 36.4 34.8* 39.1   MCV 87.7 88.1 88.0    167 168     Lab Results   Component Value Date    CREATININE 0.7 12/22/2022     Lab Results   Component Value Date    LABALBU 3.5 12/19/2022     Lab Results   Component Value Date    ALT 27 12/19/2022    AST 34 12/19/2022    ALKPHOS 114 12/19/2022    BILITOT 0.7 12/19/2022      Lab Results   Component Value Date    LABA1C 6.2 10/05/2016     Other recent pertinent labs: Anion gap 12. WBC diff normal now.    ______________________________    Recent pertinent micro results:  BCx negative x 2.  ______________________________    Recent imaging results (last 7 days):     No results found.      Assessment:     Patient Active Problem List   Diagnosis Code    Chronic leg pain M79.606, G89.29    Lymphedema of left lower extremity I89.0    Hypertension I10    Primary osteoarthritis involving multiple joints M15.9    Heart murmur R01.1    Cellulitis of left lower extremity L03.116    KRZYSZTOF (acute kidney injury) ([facility] [address] N17.9    Sepsis with acute renal failure without septic shock ([facility]) A41.9, R65.20, N17.9    Ulcer of left shin limited to breakdown of skin ([facility]) L97.821    Recurrent cellulitis of lower extremity L03.119    Obesity E66.9    Degeneration of intervertebral disc, site unspecified [id number]    Mixed hyperlipidemia E78.2    Mixed stress and urge urinary incontinence N39.46    Nuclear sclerotic cataract of both eyes H25.13    Paroxysmal atrial fibrillation (HCC) I48.0    Reflux esophagitis K21.00    Simple chronic bronchitis (HCC) J41.0    Type 2 diabetes mellitus without complication, without long-term current use of insulin (HCC) E11.9     Assessment of today's active condition(s):      --  Background of obesity, COPD, heart disease, well-controlled DM. --          LLE lymphedema related to prior TKR, perhaps some venous disease, and then also that one severe episode of cellulitis a few years ago, which certainly did sound like Strep infection. More or less successful suppression with oral PCN for a few years, but then within a week or so of stopping PCN, an acute relapse in LLE cellulitis, again likely Strep. -- Seemed that she was improving pretty well on Unasyn as of yesterday (in terms of temp, WBC count, overall symptoms, and decreased pain), but in the last half-day, I actually think the LLE looks a bit worse, probably most likely just from the effect that we sometimes see with high inoculum Strep infections that can be slow to turn around on beta-lactam therapy alone, especially when a lot of edema is present. I was hopeful he could go home today, but it seems a bit risky now (in terms of chances of infection worsening after a switch to oral therapy). Treatment recs:     Unfortunately I don't think she's ready for discharge today. Temp and WBC count are down, but pain seems a bit worse (didn't sleep, Tylenol not adequate for her), and the leg looks a bit worse than 12-16 hours ago, in terms of swelling and redness, and the extent of cellulitis / angitis up into the medial thigh. Continue Unasyn but add IV clinda. Continue to alternate some ambulation and elevation, keep that Ernesto Reusing in place when she can tolerate it, for some mild compression. Analgesia per hospitalists. I'll see her again early tomorrow. D/W her 2W RN. D/W Dr. Hensley Side later today.      Electronically signed by Elyssa Bolivar MD on 12/22/2022 at 7:55 AM.

## 2022-12-22 NOTE — PLAN OF CARE
Problem: ABCDS Injury Assessment  Goal: Absence of physical injury  Outcome: Progressing     Problem: Skin/Tissue Integrity  Goal: Absence of new skin breakdown  Description: 1. Monitor for areas of redness and/or skin breakdown  2. Assess vascular access sites hourly  3. Every 4-6 hours minimum:  Change oxygen saturation probe site  4. Every 4-6 hours:  If on nasal continuous positive airway pressure, respiratory therapy assess nares and determine need for appliance change or resting period.   Outcome: Progressing     Problem: Pain  Goal: Verbalizes/displays adequate comfort level or baseline comfort level  Outcome: Progressing     Problem: Discharge Planning  Goal: Discharge to home or other facility with appropriate resources  Outcome: Progressing     Problem: Chronic Conditions and Co-morbidities  Goal: Patient's chronic conditions and co-morbidity symptoms are monitored and maintained or improved  Outcome: Progressing

## 2022-12-22 NOTE — PROGRESS NOTES
Hospitalist Progress Note      PCP: Bry Rust MD    Date of Admission: 12/19/2022    Subjective: feels redness improved but c/o constant itch in LLE, still with pain    Medications:  Reviewed    Infusion Medications    dextrose      sodium chloride       Scheduled Medications    clindamycin (CLEOCIN) IV  600 mg IntraVENous Q8H    enoxaparin  1 mg/kg SubCUTAneous BID    aspirin  81 mg Oral Daily    warfarin placeholder: dosing by pharmacy   Other RX Placeholder    spironolactone  50 mg Oral Daily    sodium chloride flush  5-40 mL IntraVENous 2 times per day    ampicillin-sulbactam  3,000 mg IntraVENous Q6H    insulin lispro  0-4 Units SubCUTAneous TID WC    insulin lispro  0-4 Units SubCUTAneous Nightly    atorvastatin  40 mg Oral Nightly     PRN Meds: albuterol sulfate HFA, hydrOXYzine HCl, glucose, dextrose bolus **OR** dextrose bolus, glucagon (rDNA), dextrose, sodium chloride flush, sodium chloride, ondansetron **OR** ondansetron, polyethylene glycol, acetaminophen **OR** acetaminophen    No intake or output data in the 24 hours ending 12/22/22 0956    Physical Exam Performed:    BP (!) 156/82   Pulse 75   Temp 97.2 °F (36.2 °C) (Oral)   Resp 18   Ht 5' 5\" (1.651 m)   Wt 245 lb 11.2 oz (111.4 kg)   SpO2 98%   Breastfeeding No   BMI 40.89 kg/m²        Gen: No distress. Alert. Eyes: PERRL. No sclera icterus. No conjunctival injection. ENT: No discharge. Pharynx clear. Neck: Trachea midline. Resp: No accessory muscle use. No crackles. No wheezes. No rhonchi. CV: Regular rate. Regular rhythm. + murmur. No rub. No edema. GI: Non-tender. Non-distended. Normal bowel sounds. No hernia. Skin: Warm and dry. Erythema, swelling, Left lower extremity, extending up into groin. Tender to palpation  M/S: No cyanosis. No joint deformity. No clubbing. Neuro: Awake. Grossly nonfocal    Psych: Oriented x 3. No anxiety or agitation.         Labs:   Recent Labs     12/20/22  7602 12/21/22  3205 12/22/22  0533   WBC 18.0* 11.7* 7.4   HGB 13.1 11.8* 12.4   HCT 39.1 34.8* 36.4    167 172     Recent Labs     12/19/22  1652 12/21/22  0614 12/22/22  0533   * 137 140   K 4.5 3.9 3.6   CL 98* 103 104   CO2 20* 23 24   BUN 27* 14 9   CREATININE 1.7* 0.7 0.7   CALCIUM 10.0 8.0* 8.1*     Recent Labs     12/19/22  1652   AST 34   ALT 27   BILITOT 0.7   ALKPHOS 114     Recent Labs     12/20/22  1318 12/21/22  0614 12/22/22  0533   INR 2.13* 2.00* 2.14*     No results for input(s): Doyne Knock in the last 72 hours.     Urinalysis:      Lab Results   Component Value Date/Time    NITRU Negative 11/21/2013 09:50 AM    BLOODU Negative 11/21/2013 09:50 AM    SPECGRAV 1.020 11/21/2013 09:50 AM    GLUCOSEU Negative 11/21/2013 09:50 AM       Radiology:  VL Extremity Venous Bilateral   Final Result          IP CONSULT TO HOSPITALIST  PHARMACY TO DOSE WARFARIN  IP CONSULT TO INFECTIOUS DISEASES    Assessment/Plan:    Active Hospital Problems    Diagnosis     Ulcer of left shin limited to breakdown of skin ([facility] [L97.821]      Priority: Medium    Recurrent cellulitis of lower extremity [L03.119]      Priority: Medium    Obesity [E66.9]      Priority: Medium    Type 2 diabetes mellitus without complication, without long-term current use of insulin (HCC) [E11.9]      Priority: Medium    KRZYSZTOF (acute kidney injury) ([facility] [address] [N17.9]      Priority: Medium    Sepsis with acute renal failure without septic shock ([facility] [A41.9, R65.20, N17.9]      Priority: Medium    Cellulitis of left lower extremity [C92.221]      Priority: Medium    Lymphedema of left lower extremity [I89.0]          Sepsis, POA  -Leukocytosis, lactic acidosis, fever, BP  -due to Celulitis  -blood cx pending  -Unasyn D#1, IVF's  -trend Lactic, WBC    Cellulitis Left lower extremity   -extending up to groin.  -Unasyn D#2  - redness improved, leucocytosis improved  - will consult ID     KRZYSZTOF - resolved  -Creatinine on admission 1.7-->0.9  -baseline ~0.9-0.8  -IVF's added. Monitor BMP  -hold Lasix. DM type 2  -controlled  -monitor glucose. -SSI coverage ordered     Hypertension  BP borderline. Holding Lopressor     Hyperlipidemia  -on Atorvastatin     H/o Aortic Valve replacement  -on Coumadin  Monitor PT/INR     H/o A-fib S/p TAVR  -on Lopressor. Chronic diastolic CHF  -stable. No s/s decompensation  -hold Lasix, cont Aldactone    COPD  -stable. No AE  -Albuterol prn     DVT Prophylaxis: Coumadin  Diet: ADULT DIET;  Regular; 4 carb choices (60 gm/meal)  Code Status: Limited  PT/OT Eval Status: ordered    Alban Montanez MD

## 2022-12-23 VITALS
WEIGHT: 245.7 LBS | OXYGEN SATURATION: 100 % | RESPIRATION RATE: 18 BRPM | TEMPERATURE: 97 F | HEART RATE: 71 BPM | DIASTOLIC BLOOD PRESSURE: 73 MMHG | HEIGHT: 65 IN | BODY MASS INDEX: 40.94 KG/M2 | SYSTOLIC BLOOD PRESSURE: 135 MMHG

## 2022-12-23 LAB
ANION GAP SERPL CALCULATED.3IONS-SCNC: 13 MMOL/L (ref 3–16)
BASOPHILS ABSOLUTE: 0 K/UL (ref 0–0.2)
BASOPHILS RELATIVE PERCENT: 0.8 %
BLOOD CULTURE, ROUTINE: NORMAL
BUN BLDV-MCNC: 8 MG/DL (ref 7–20)
CALCIUM SERPL-MCNC: 8.8 MG/DL (ref 8.3–10.6)
CHLORIDE BLD-SCNC: 103 MMOL/L (ref 99–110)
CO2: 23 MMOL/L (ref 21–32)
CREAT SERPL-MCNC: 0.6 MG/DL (ref 0.6–1.2)
CULTURE, BLOOD 2: NORMAL
EOSINOPHILS ABSOLUTE: 0.4 K/UL (ref 0–0.6)
EOSINOPHILS RELATIVE PERCENT: 7 %
GFR SERPL CREATININE-BSD FRML MDRD: >60 ML/MIN/{1.73_M2}
GLUCOSE BLD-MCNC: 123 MG/DL (ref 70–99)
GLUCOSE BLD-MCNC: 126 MG/DL (ref 70–99)
GLUCOSE BLD-MCNC: 99 MG/DL (ref 70–99)
HCT VFR BLD CALC: 34.5 % (ref 36–48)
HEMOGLOBIN: 11.6 G/DL (ref 12–16)
INR BLD: 2.7 (ref 0.87–1.14)
LYMPHOCYTES ABSOLUTE: 1.6 K/UL (ref 1–5.1)
LYMPHOCYTES RELATIVE PERCENT: 30.6 %
MAGNESIUM: 1.6 MG/DL (ref 1.8–2.4)
MCH RBC QN AUTO: 29.5 PG (ref 26–34)
MCHC RBC AUTO-ENTMCNC: 33.6 G/DL (ref 31–36)
MCV RBC AUTO: 87.8 FL (ref 80–100)
MONOCYTES ABSOLUTE: 1 K/UL (ref 0–1.3)
MONOCYTES RELATIVE PERCENT: 19.1 %
NEUTROPHILS ABSOLUTE: 2.2 K/UL (ref 1.7–7.7)
NEUTROPHILS RELATIVE PERCENT: 42.5 %
PDW BLD-RTO: 14.9 % (ref 12.4–15.4)
PERFORMED ON: ABNORMAL
PERFORMED ON: NORMAL
PLATELET # BLD: 167 K/UL (ref 135–450)
PMV BLD AUTO: 9.4 FL (ref 5–10.5)
POTASSIUM REFLEX MAGNESIUM: 3.4 MMOL/L (ref 3.5–5.1)
PROTHROMBIN TIME: 28.6 SEC (ref 11.7–14.5)
RBC # BLD: 3.94 M/UL (ref 4–5.2)
SODIUM BLD-SCNC: 139 MMOL/L (ref 136–145)
WBC # BLD: 5.2 K/UL (ref 4–11)

## 2022-12-23 PROCEDURE — 2500000003 HC RX 250 WO HCPCS: Performed by: INTERNAL MEDICINE

## 2022-12-23 PROCEDURE — 99232 SBSQ HOSP IP/OBS MODERATE 35: CPT | Performed by: INTERNAL MEDICINE

## 2022-12-23 PROCEDURE — 83735 ASSAY OF MAGNESIUM: CPT

## 2022-12-23 PROCEDURE — 6370000000 HC RX 637 (ALT 250 FOR IP): Performed by: INTERNAL MEDICINE

## 2022-12-23 PROCEDURE — 36415 COLL VENOUS BLD VENIPUNCTURE: CPT

## 2022-12-23 PROCEDURE — 80048 BASIC METABOLIC PNL TOTAL CA: CPT

## 2022-12-23 PROCEDURE — 2580000003 HC RX 258: Performed by: INTERNAL MEDICINE

## 2022-12-23 PROCEDURE — 6370000000 HC RX 637 (ALT 250 FOR IP): Performed by: NURSE PRACTITIONER

## 2022-12-23 PROCEDURE — 85610 PROTHROMBIN TIME: CPT

## 2022-12-23 PROCEDURE — 85025 COMPLETE CBC W/AUTO DIFF WBC: CPT

## 2022-12-23 PROCEDURE — 6360000002 HC RX W HCPCS: Performed by: INTERNAL MEDICINE

## 2022-12-23 RX ORDER — TRAMADOL HYDROCHLORIDE 50 MG/1
50 TABLET ORAL EVERY 6 HOURS PRN
Qty: 12 TABLET | Refills: 0 | Status: SHIPPED | OUTPATIENT
Start: 2022-12-23 | End: 2022-12-26

## 2022-12-23 RX ORDER — WARFARIN SODIUM 5 MG/1
5 TABLET ORAL
Status: DISCONTINUED | OUTPATIENT
Start: 2022-12-23 | End: 2022-12-23 | Stop reason: DRUGHIGH

## 2022-12-23 RX ORDER — CLINDAMYCIN HYDROCHLORIDE 300 MG/1
300 CAPSULE ORAL 4 TIMES DAILY
Qty: 20 CAPSULE | Refills: 0 | Status: SHIPPED | OUTPATIENT
Start: 2022-12-23 | End: 2022-12-28

## 2022-12-23 RX ORDER — PENICILLIN V POTASSIUM 500 MG/1
TABLET ORAL
Qty: 60 TABLET | Refills: 0 | Status: SHIPPED | OUTPATIENT
Start: 2022-12-23 | End: 2023-01-17

## 2022-12-23 RX ORDER — WARFARIN SODIUM 5 MG/1
5 TABLET ORAL ONCE
Status: DISCONTINUED | OUTPATIENT
Start: 2022-12-23 | End: 2022-12-23 | Stop reason: HOSPADM

## 2022-12-23 RX ADMIN — ASPIRIN 81 MG: 81 TABLET, CHEWABLE ORAL at 10:37

## 2022-12-23 RX ADMIN — SPIRONOLACTONE 50 MG: 25 TABLET ORAL at 10:37

## 2022-12-23 RX ADMIN — CLINDAMYCIN IN 5 PERCENT DEXTROSE 600 MG: 12 INJECTION, SOLUTION INTRAVENOUS at 00:54

## 2022-12-23 RX ADMIN — CLINDAMYCIN IN 5 PERCENT DEXTROSE 600 MG: 12 INJECTION, SOLUTION INTRAVENOUS at 10:31

## 2022-12-23 RX ADMIN — SODIUM CHLORIDE 25 ML: 9 INJECTION, SOLUTION INTRAVENOUS at 10:31

## 2022-12-23 RX ADMIN — AMPICILLIN SODIUM AND SULBACTAM SODIUM 3000 MG: 2; 1 INJECTION, POWDER, FOR SOLUTION INTRAMUSCULAR; INTRAVENOUS at 11:41

## 2022-12-23 RX ADMIN — SODIUM CHLORIDE, PRESERVATIVE FREE 10 ML: 5 INJECTION INTRAVENOUS at 10:31

## 2022-12-23 RX ADMIN — AMPICILLIN SODIUM AND SULBACTAM SODIUM 3000 MG: 2; 1 INJECTION, POWDER, FOR SOLUTION INTRAMUSCULAR; INTRAVENOUS at 04:31

## 2022-12-23 ASSESSMENT — PAIN SCALES - GENERAL: PAINLEVEL_OUTOF10: 2

## 2022-12-23 ASSESSMENT — PAIN DESCRIPTION - ORIENTATION: ORIENTATION: LEFT

## 2022-12-23 ASSESSMENT — PAIN DESCRIPTION - LOCATION: LOCATION: LEG

## 2022-12-23 NOTE — PROGRESS NOTES
Removed peripheral IV for discharge. IV bled, applied pressure, and pressure drsg applied. IV catheter intact. Med's to beds delivered at [phone]. Prescriptions and discharge instructions given. Pt verbalized understanding denies any questions/needs at this time. Transport called to transport pt to vehicle for discharge home.

## 2022-12-23 NOTE — PROGRESS NOTES
[facility] Infectious Disease Progress Note      Maria G Beard     : 1950    DATE OF VISIT:  2022  DATE OF ADMISSION:  2022       Subjective:     Mraia G Beard is a 67 y.o. female whom I've been seeing for a recurrent LLE cellulitis. Since I last saw her, she's been feeling fine systemically, no fever, tolerating Abx well. Still had a fair amount of leg pain, sometimes brief shooting episodes now. Didn't sleep well due to pain; tried tramadol but it didn't do much. She's concerned that swelling seems a bit worse, but thinks redness is better. Ms. Chio Lanza has a past medical history of KRZYSZTOF (acute kidney injury) ([address] Amaurosis fugax, Depression with anxiety, Environmental allergies, Nonrheumatic aortic valve stenosis, NSVT (nonsustained ventricular tachycardia), Osteoarthritis of knees, bilateral, Tobacco use disorder, Typical atrial flutter ([address] and Vocal cord polyp.     Current Facility-Administered Medications: clindamycin (CLEOCIN) 600 mg in dextrose 5 % 50 mL IVPB, 600 mg, IntraVENous, Q8H  traMADol (ULTRAM) tablet 50 mg, 50 mg, Oral, Q6H PRN  enoxaparin (LOVENOX) injection 105 mg, 1 mg/kg, SubCUTAneous, BID  albuterol sulfate HFA (PROVENTIL;VENTOLIN;PROAIR) 108 (90 Base) MCG/ACT inhaler 2 puff, 2 puff, Inhalation, Q4H PRN  aspirin chewable tablet 81 mg, 81 mg, Oral, Daily  warfarin placeholder: dosing by pharmacy, , Other, RX Placeholder  hydrOXYzine HCl (ATARAX) tablet 10 mg, 10 mg, Oral, TID PRN  spironolactone (ALDACTONE) tablet 50 mg, 50 mg, Oral, Daily  glucose chewable tablet 16 g, 4 tablet, Oral, PRN  dextrose bolus 10% 125 mL, 125 mL, IntraVENous, PRN **OR** dextrose bolus 10% 250 mL, 250 mL, IntraVENous, PRN  glucagon (rDNA) injection 1 mg, 1 mg, SubCUTAneous, PRN  dextrose 10 % infusion, , IntraVENous, Continuous PRN  sodium chloride flush 0.9 % injection 5-40 mL, 5-40 mL, IntraVENous, 2 times per day  sodium chloride flush 0.9 % injection 5-40 mL, 5-40 mL, IntraVENous, PRN  0.9 % sodium chloride infusion, , IntraVENous, PRN  ondansetron (ZOFRAN-ODT) disintegrating tablet 4 mg, 4 mg, Oral, Q8H PRN **OR** ondansetron (ZOFRAN) injection 4 mg, 4 mg, IntraVENous, Q6H PRN  polyethylene glycol (GLYCOLAX) packet 17 g, 17 g, Oral, Daily PRN  acetaminophen (TYLENOL) tablet 650 mg, 650 mg, Oral, Q6H PRN **OR** acetaminophen (TYLENOL) suppository 650 mg, 650 mg, Rectal, Q6H PRN  ampicillin-sulbactam (UNASYN) 3,000 mg in sodium chloride 0.9 % 100 mL IVPB (mini-bag), 3,000 mg, IntraVENous, Q6H  insulin lispro (HUMALOG) injection vial 0-4 Units, 0-4 Units, SubCUTAneous, TID WC  insulin lispro (HUMALOG) injection vial 0-4 Units, 0-4 Units, SubCUTAneous, Nightly  atorvastatin (LIPITOR) tablet 40 mg, 40 mg, Oral, Nightly     This is day 4 of Unasyn, day 2 of Clinda. Allergies: Ace inhibitors, Lisinopril, Mobic, and Morphine    Pertinent items from the review of systems are discussed in the HPI; the remainder of the ROS was reviewed and is negative.      Objective:     Vital signs over the last 24 hours:  Temp  Av.3 °F (36.3 °C)  Min: 97 °F (36.1 °C)  Max: 97.6 °F (36.4 °C)  Pulse  Av  Min: 74  Max: 78  Systolic (89SDV), QDW:690 , Min:125 , YXX:281   Diastolic (94PFS), JXC:80, Min:76, Max:78  Resp  Av.4  Min: 16  Max: 18  SpO2  Av %  Min: 95 %  Max: 100 %    Constitutional:  well-developed, well-nourished, NAD, conversant, a bit uncomfortable, not ill appearing  Psychiatric:  oriented to person, place and time; mood and affect appropriate for the situation   ENT:  atraumatic; oral mucosa moist, no thrush or ulcers  Cardiovascular:  heart regular, no gallop, no murmur; very mild right and moderate left lower extremity lymphedema (a bit worse than ) -- stage 2, no trophic changes, but a positive Stemmers sign; no IV phlebitis  GI:  abdomen soft, NT, ND, normal bowel sounds, no palpable masses or organomegaly  Lymph: small but now nontender left inguinal nodes; patches of left medial (prox and distal) angitis are faded compared to yesterday, and the lower leg cellulitis itself is faded a bit also (mostly anterior), maybe not as tender, definitely not as warm, no fluctuance. Musculoskeletal:  no clubbing, cyanosis or petechiae; extremities with no gross effusions, joint misalignment or acute arthritis  Skin: warm, dry, normal turgor; no drug rash; left shin ulcer not examined today.  ______________________________    Recent Labs     12/23/22  0548 12/22/22  0533 12/21/22  0614   WBC 5.2 7.4 11.7*   HGB 11.6* 12.4 11.8*   HCT 34.5* 36.4 34.8*   MCV 87.8 87.7 88.1    172 167     Lab Results   Component Value Date    CREATININE 0.6 12/23/2022     Lab Results   Component Value Date    LABALBU 3.5 12/19/2022     Lab Results   Component Value Date    ALT 27 12/19/2022    AST 34 12/19/2022    ALKPHOS 114 12/19/2022    BILITOT 0.7 12/19/2022      Lab Results   Component Value Date    LABA1C 6.2 10/05/2016     Other recent pertinent labs: Anion gap 13. Glucoses in the low 100s. WBC diff normal.   ______________________________    Recent pertinent micro results:  BCx (-) x 2.  ______________________________    Recent imaging results (last 7 days):     No results found.      Assessment:     Patient Active Problem List   Diagnosis Code    Chronic leg pain M79.606, G89.29    Lymphedema of left lower extremity I89.0    Hypertension I10    Primary osteoarthritis involving multiple joints M15.9    Heart murmur R01.1    Cellulitis of left lower extremity L03.116    KRZYSZTOF (acute kidney injury) ([facility] N17.9    Sepsis with acute renal failure without septic shock (HCC) A41.9, R65.20, N17.9    Ulcer of left shin limited to breakdown of skin (HCC) L97.821    Recurrent cellulitis of lower extremity L03.119    Obesity E66.9    Degeneration of intervertebral disc, site unspecified [id number]    Mixed hyperlipidemia E78.2    Mixed stress and urge urinary incontinence N39.46    Nuclear sclerotic cataract of both eyes H25.13    Paroxysmal atrial fibrillation (HCC) I48.0    Reflux esophagitis K21.00    Simple chronic bronchitis (HCC) J41.0    Type 2 diabetes mellitus without complication, without long-term current use of insulin (HCC) E11.9     Assessment of today's active condition(s):      --          Background of obesity, COPD, heart disease, well-controlled DM. --          LLE lymphedema related to prior TKR, perhaps some venous disease, and then also that one severe episode of cellulitis a few years ago, which certainly did sound like Strep infection. More or less successful suppression with oral PCN for a few years, but then within a week or so of stopping PCN, an acute relapse in LLE cellulitis, again likely Strep. --          Seemed that she was improving pretty well on Unasyn as of Wednesday (in terms of temp, WBC count, overall symptoms, and decreased pain), but worse on Thursday (in terms of LLE swelling, redness and pain). After 24 hours of adding clinda for high-inoculum Strep infection, she seems to be improving again. Tolerating Abx well. The slight increase in swelling doesn't worry me as much as the noticeable improvement in thigh redness reassures me. Treatment recs:     I'm ok with home today. Continue her practice of walking as much as she can tolerate, but also elevating at rest. Medigrip compression when she can tolerate it. Simple bandage to the left shin. She also has PRN Lasix at home that she can take for excess swelling, but I would limit that to no more than 2-3 x per week. Five days of QID Clinda + PCN, and then probably life-long BID PCN suppression. I'll call scripts to our outpatient pharmacy here. My office will call her Tuesday for an appt in 2 weeks. _________________    I was not necessarily going to be in the hospital over the weekend to see Ms. Chintan Ying. Will keep an eye on Epic from home.  Please call or text if there are any urgent concerns over the weekend with her clinical course, test results, etc. I'm currently due to be back on Tuesday.      Electronically signed by Best Bah MD on 12/23/2022 at 8:25 AM.

## 2022-12-23 NOTE — CARE COORDINATION
DISCHARGE ORDER  Date/Time 2022 11:41 AM  Completed by: Kierra Grossman RN, Case Management    Patient Name: Jacob El      : 1950  Admitting Diagnosis: Cellulitis [L03.90]  Cellulitis of left lower extremity [U27.571]      Admit order Date and Status:2022  (verify MD's last order for status of admission)      Noted discharge order. If applicable PT/OT recommendation at Discharge: n/a  DME recommendation by PT/OT:n/a  Confirmed discharge plan  (pt): Yes  with whom_____________pt__  If pt confirmed DC plan does family need to be contacted by CM No if yes who______  Discharge Plan: Reviewed chart. Role of discharge planner explained and patient verbalized understanding. Discharge order is noted. Pt is being d/c'd to home today. Pt's O2 sats are 100% on RA. Pt is discharging on oral abx. Pt lives at home with spouse and declines Home Care (HC/C). No further discharge needs needed or noted. Pt is awaiting the rest of her IV abx to finish and it will take 45 minutes for this to finish. Date of Last IMM Given: 2022 and pt is agreeable with discharge    Reviewed chart. Role of discharge planner explained and patient verbalized understanding. Discharge order is noted. Has Home O2 in place on admit:  No  Informed of need to bring portable home O2 tank on day of discharge for nursing to connect prior to leaving:   No  Verbalized agreement/Understanding:   No    Discharge timeout done with Amandeep Castellano. All discharge needs and concerns addressed.

## 2022-12-23 NOTE — PROGRESS NOTES
Pt resting. Resp e/e. Shift assessment completed and charted. No needs. Will monitor.  Delta Prajapati RN

## 2022-12-23 NOTE — PROGRESS NOTES
Pharmacy Note  Warfarin Consult  Dx: AVR  Goal INR range  2.5-3.5 (per consult note)  Home Warfarin dose:5mg daily  New start Warfarin with Heparin/Enoxaparin bridge. Would recommend continue bridge until INR therapeutic. Date  INR  Warfarin  12/20             2.13                   7mg  12/21             2.00                   7.5mg  12/22             2.14                   7.5mg  12/23             2.70                   5.0mg  Recommend Warfarin 5mg mg tonight x1. Daily INR ordered. Rx will continue to manage therapy per consult order. Zuhair Kay Pharm LAUREEN 12/23/20228:37 AM  .      . .      .

## 2022-12-28 NOTE — DISCHARGE INSTRUCTIONS
[facility] Physician Orders and Discharge [facility]  [address], [address]  [place], [facility]  Telephone: [phone]      Fax: [phone]      Your home care company:  N/a . NAME:  Jhonny Gasca   YOB: 1950  PRIMARY DIAGNOSIS FOR WOUND CARE CENTER:  Cellulitis follow up . New orders for this week (labs, imaging, medications, etc.):    Script for PCN V sent to pharmacy per Dr Guido Monroy 1/3/2023    Script for Clobetasol sent to pharmacy per Dr Guido Monroy 1/3/23, use as directed. You may purchase Scar away silicone sheets to place on left shin as directed per Dr Guido Monroy. Wear your 15-20 mmHg stockings to help control the swelling in your legs. Plan to follow up via phone in 1 year re: long-term antibiotics & refills. CONGRATULATIONS! You have completed your treatment program!  We have created a list of general reminders to assist you in preventing future wounds:     1. Check your skin daily for reddened areas, abrasions, or sores. If a new wound is noted, call the [address] [place] [address] at [phone].   2.   Clean your using mild soap and warm (not hot) water, daily. 3.   If your skin appears dry, apply lotion as needed, but not between the toes. 4.   Avoid pressure and trauma to recently healed wounds. The skin is still very fragile and will break down easily. 5.   Always wear socks and well-fitting shoes. Never walk barefoot. 6.   Maintain a nutritious diet, minimizing alcohol, caffeine, and excess sugar. We can give you recommendations for increasing the amount of protein in your diet, if you're interested. 7. Probably the best thing you can do to prevent wounds in the future is to stop smoking, if you are a current smoker. If we've not discussed this before, please ask about ways that we could help you quit.    8. If you are significantly overweight (a BMI of > 27), [facility]) [facility] has several different treatment options that can help you. You can contact them at [phone].  ______________________________________________________________________     THINGS SPECIFIC TO YOUR WOUND(S):    VENOUS OR LYMPHEDEMA LEG ULCERS:  -- It's important to continue to elevate your legs a few times per day. -- Exercise is great for venous disease and lymphedema -- walking, cycling, swimming, etc; there are leg exercises that can be done while sitting too.  -- Your current compression therapy is: 15-20 mmHg stockings. We normally advise people to apply their compression garments first thing in the morning, and take them off at bedtime. These will normally be need to replaced every 6 months or so.  ___________________________________    We strive for improvement. A survey will arrive in the mail from \"[facility]\". We appreciate your comments. No Further follow up in [address] [place] [address] Road needed at this time. Call if a wound opens, unable to control swelling with stocking or with any questions/concerns. Your nurse  is CELESTINE Galan. If we applied slip-resistant hospital socks today, be sure to remove them at least once a day to inspect your toes or feet, even if you're not changing the wraps or dressings underneath. If you see anything concerning (redness, excess moisture, etc), please call and let us know right away. Should you experience any significant changes in your wound(s) (including redness, increased warmth, increased pain, increased drainage, odor, or fever) or have questions about your wound care, please contact the [facility] at [phone] Monday-Thursday from 8:00 am - 4:30 pm, or Friday from 8:00 am - 2:30 pm.  If you need help with your wound outside these hours and cannot wait until we are again available, contact your home-care company (if applicable), your PCP, or go to the nearest emergency room.

## 2023-01-03 ENCOUNTER — HOSPITAL ENCOUNTER (OUTPATIENT)
Dept: WOUND CARE | Age: 73
Discharge: HOME | End: 2023-01-03
Payer: MEDICARE

## 2023-01-03 VITALS
RESPIRATION RATE: 20 BRPM | DIASTOLIC BLOOD PRESSURE: 74 MMHG | SYSTOLIC BLOOD PRESSURE: 152 MMHG | HEART RATE: 65 BPM | WEIGHT: 248.8 LBS | TEMPERATURE: 97.2 F | HEIGHT: 65 IN | BODY MASS INDEX: 41.45 KG/M2

## 2023-01-03 DIAGNOSIS — L91.0 HYPERTROPHIC SCAR OF SKIN: ICD-10-CM

## 2023-01-03 PROBLEM — L97.821 ULCER OF LEFT SHIN LIMITED TO BREAKDOWN OF SKIN (HCC): Status: RESOLVED | Noted: 2022-12-21 | Resolved: 2023-01-03

## 2023-01-03 PROBLEM — N17.9 AKI (ACUTE KIDNEY INJURY) (HCC): Status: RESOLVED | Noted: 2022-12-20 | Resolved: 2023-01-03

## 2023-01-03 PROBLEM — L03.116 CELLULITIS OF LEFT LOWER EXTREMITY: Status: RESOLVED | Noted: 2022-12-19 | Resolved: 2023-01-03

## 2023-01-03 PROBLEM — N17.9 SEPSIS WITH ACUTE RENAL FAILURE WITHOUT SEPTIC SHOCK (HCC): Status: RESOLVED | Noted: 2022-12-20 | Resolved: 2023-01-03

## 2023-01-03 PROBLEM — A41.9 SEPSIS WITH ACUTE RENAL FAILURE WITHOUT SEPTIC SHOCK (HCC): Status: RESOLVED | Noted: 2022-12-20 | Resolved: 2023-01-03

## 2023-01-03 PROBLEM — R65.20 SEPSIS WITH ACUTE RENAL FAILURE WITHOUT SEPTIC SHOCK (HCC): Status: RESOLVED | Noted: 2022-12-20 | Resolved: 2023-01-03

## 2023-01-03 PROCEDURE — 99213 OFFICE O/P EST LOW 20 MIN: CPT | Performed by: INTERNAL MEDICINE

## 2023-01-03 PROCEDURE — 99213 OFFICE O/P EST LOW 20 MIN: CPT

## 2023-01-03 RX ORDER — ACETAMINOPHEN 325 MG/1
650 TABLET ORAL NIGHTLY
COMMUNITY

## 2023-01-03 RX ORDER — PENICILLIN V POTASSIUM 500 MG/1
500 TABLET ORAL 2 TIMES DAILY
Qty: 60 TABLET | Refills: 11 | Status: SHIPPED | OUTPATIENT
Start: 2023-01-03 | End: 2023-12-29

## 2023-01-03 RX ORDER — IBUPROFEN 600 MG/1
600 TABLET ORAL NIGHTLY
COMMUNITY

## 2023-01-03 RX ORDER — CLOBETASOL PROPIONATE 0.5 MG/G
OINTMENT TOPICAL
Qty: 30 G | Refills: 1 | Status: SHIPPED | OUTPATIENT
Start: 2023-01-03

## 2023-01-03 ASSESSMENT — PAIN - FUNCTIONAL ASSESSMENT: PAIN_FUNCTIONAL_ASSESSMENT: ACTIVITIES ARE NOT PREVENTED

## 2023-01-03 ASSESSMENT — PAIN DESCRIPTION - DESCRIPTORS: DESCRIPTORS: DISCOMFORT

## 2023-01-03 ASSESSMENT — PAIN DESCRIPTION - ORIENTATION: ORIENTATION: LEFT

## 2023-01-03 ASSESSMENT — PAIN DESCRIPTION - LOCATION: LOCATION: LEG

## 2023-01-03 ASSESSMENT — PAIN DESCRIPTION - FREQUENCY: FREQUENCY: INTERMITTENT

## 2023-01-03 NOTE — PROGRESS NOTES
[facility] Infectious Disease Progress Note      Maritza Adames     : 1950    DATE OF VISIT:  1/3/2023    Subjective:     Maritza Adames is a 67 y.o. female whom I've been seeing for a recurrent LE cellulitis, with a background of LLE lymphedema, and a small shin ulcer. Since I last saw her, she actually called late last week to report that she had some significant side effects from her oral clinda (heartburn, nausea, decreased appetite, diarrhea); the symptoms really started to bother her during her last day or two of Rx, so she did actually complete it. I added it to her allergy (intolerance) list though. She was happy with the improvement in her leg, has stepped her PCN down from QID to BID as instructed. Less swelling overall, less pain, basically resolved redness. She did again note a couple of areas of sometimes severe pruritus (to the point that she wants to \"reach in and gouge out the skin on her leg\"). This doesn't always happen when her swelling is worse, is mostly on the left shin, sometimes on the right, where she has a couple of scars from prior superficial wounds. No F/C/D, UGI symptoms resolved, diarrhea improving with time and a probiotic. For compression, she had used a 30-40 mmHg stocking before, but that caused pain near her knee, so she backed off to a 15-20 mmHg stocking, which usually controlled her swelling pretty well; she still took an occasional dose of Lasix when it was increased; for right now, with the leg more achy from recent infection, she's just using a length of tubular compression.      Ms. Megan Chandler has a past medical history of KRZYSZTOF (acute kidney injury) ([address] Amaurosis fugax, Depression with anxiety, Diabetes mellitus, type 2 ([address] Environmental allergies, Nonrheumatic aortic valve stenosis, NSVT (nonsustained ventricular tachycardia), Osteoarthritis of knees, bilateral, Tobacco use disorder, Typical atrial flutter ([address] and Vocal cord polyp.    Her current medication list consists of Albuterol Sulfate, Multiple Vitamins-Minerals, Vitamin D3, acetaminophen, aspirin, atorvastatin, budesonide, furosemide (PRN, often just 1-2 x per week), ibuprofen, magnesium citrate, metFORMIN, metoprolol tartrate, olopatadine, penicillin v potassium, spironolactone, vitamin C, warfarin, and zinc gluconate. PCN is down to 500 BID now. Allergies: Ace inhibitors, Clindamycin/lincomycin, Lisinopril, Mobic, and Morphine    Pertinent items from the review of systems are discussed in the HPI; the remainder of the ROS was reviewed and is negative. Objective:     Vitals:    01/03/23 1246   BP: (!) 152/74   Pulse: 65   Resp: 20   Temp: 97.2 °F (36.2 °C)   TempSrc: Oral   Weight: 248 lb 12.8 oz (112.9 kg)   Height: 5' 5\" (1.651 m)       Constitutional:  well-developed, well-nourished, NAD, conversant, not ill appearing  Psychiatric:  oriented to person, place and time; mood and affect appropriate for the situation   ENT:  atraumatic; oral mucosa moist, no thrush or ulcers  ENT: no thrush or oral ulcers, mucous membranes moist  Abd: soft, NT, ND, good BS  Cardiovascular:  very mild right and moderate left lower extremity lymphedema (better than during her admission) -- stage 2, no trophic changes, but a positive Stemmers sign  Lymph: small but nontender left inguinal nodes; patches of left medial (prox and distal) angitis are faded and resolved, as the lower leg cellulitis itself  Musculoskeletal:  no clubbing, cyanosis or petechiae; extremities with no gross effusions, joint misalignment or acute arthritis  Skin: warm, dry, normal turgor; no drug rash; left shin ulcer healed, but she does have a couple of hypertrophic and slightly inflamed scars on her lower legs, from prior wounds.    ______________________________    Recent Labs     12/23/22  0548 12/22/22  0533 12/21/22  0614   WBC 5.2 7.4 11.7*   HGB 11.6* 12.4 11.8*   HCT 34.5* 36.4 34.8*   MCV 87.8 87.7 88.1  172 167     Lab Results   Component Value Date    CREATININE 0.6 12/23/2022     Lab Results   Component Value Date    LABALBU 3.5 12/19/2022     Lab Results   Component Value Date    ALT 27 12/19/2022    AST 34 12/19/2022    ALKPHOS 114 12/19/2022    BILITOT 0.7 12/19/2022      Lab Results   Component Value Date    LABA1C 6.2 10/05/2016     Other recent pertinent labs:  Dec 23 INR 2.70.   ______________________________    Recent pertinent micro results:  Dec 19 BCx (-) x 2.  ______________________________    Recent imaging results (last 7 days):     No results found. Assessment:     Patient Active Problem List   Diagnosis Code    Chronic leg pain M79.606, G89.29    Lymphedema of left lower extremity I89.0    Hypertension I10    Primary osteoarthritis involving multiple joints M15.9    Heart murmur R01.1    Ulcer of left shin limited to breakdown of skin ([facility]) L97.821    Recurrent cellulitis of lower extremity L03.119    Obesity E66.9    Degeneration of intervertebral disc, site unspecified [id number]    Mixed hyperlipidemia E78.2    Mixed stress and urge urinary incontinence N39.46    Nuclear sclerotic cataract of both eyes H25.13    Paroxysmal atrial fibrillation ([facility]) I48.0    Reflux esophagitis K21.00    Simple chronic bronchitis ([facility]) J41.0    Type 2 diabetes mellitus without complication, without long-term current use of insulin ([facility]) E11.9    Hypertrophic scar of skin of left shin L91.0     Assessment of today's active condition(s):      --          Background of obesity, COPD, heart disease, well-controlled DM. --          LLE lymphedema related to prior TKR, perhaps some venous disease, and then also that one severe episode of cellulitis a few years ago, which certainly did sound like Strep infection. More or less successful suppression with oral PCN for a few years, but then within a week or so of stopping PCN, an acute relapse in LLE cellulitis, again likely Strep.  Again resolved now, though it did take a short period of dual PCN-Clinda therapy to get her over the hump. --          Intolerance of clinda, important to note for the future. -- A couple of small sometimes intensely pruritic lesions on the lower legs, where prior ulcers had been - appearance of a hypertrophic scar, I think, and I wonder if there could be an element of prurigo nodularis too. Treatment recs:     Long-term (indefinite) oral PCN suppression, 500 mg BID. I called in a month today, and a year of refills, and if she has no other issues come up that I can help with, I can just follow up with her yearly by phone. I reminded the patient of the importance of weight management and smoking cessation, if applicable; also encouraged ambulation as tolerated, additional lower extremity exercises as instructed in our education sheet, leg elevation when at rest, and compliance with any recommended dietary, diuretic and compression therapies. -- For right now, the tubular compression is ok. -- As soon as she can tolerate it, would go back to her 20-30 stocking.  -- Can continue an occasional dose of PRN Lasix. -- If she doesn't have good lymphedema control with, or doesn't tolerate, the 20-30 mmHg stocking, we could try a Velcro garment in the future. -- If THAT isn't adequate, we could discuss a lymphedema compression pump. I called in a tube of clobetasol that she can use occasionally to those pruritic lesions on her lower legs PRN, but infrequently (only a few days in a row at one given time, for example). Also suggested silicone scar sheets (Scar-Away), etc, which can sometimes be helpful to soothe and calm down lesions like this. No in-person follow up needed at this time, but call if any lower leg ulcer should recur, or if she has a flare in cellulitis, or if she's like to try something new for her lymphedema. Otherwise, phone follow-up in a year.     Electronically signed by Yordy Latham MD on 1/3/2023 at 3:38 PM.

## 2023-01-03 NOTE — PLAN OF CARE
Patient following up in [facility] today after recent hospitalization for cellulitis in LLE. No open wounds noted today. Dr Quan Medina discussed need for long-term PCN V, new script sent to pharmacy per Dr Quan Medina, pt. Verbalizes understanding. Pt. Also c/o itching in left lower leg at times, script for Clobetasol sent to pharmacy per Dr Quan Medina. Dr Quan Medina also discussed use of Clobetasol, pt. Verbalizes understanding. Long-term compression discussed. Pt. Previously using 15-20 mmHg stockings. Dr Quan Medina discussed velcro compression garments for long-term compression. Pt. States she wants to try using her stockings & if she has any problems she will call back about ordering velcro garments. No further f/u in [facility] needed at this time. Patient to call if new wound opens, unable to control swelling with stockings or any questions/concerns. Dr Quan Medina also discussed following up via phone in 1 year re: long-term antibiotics & refills, pt. Verbalizes understanding.

## 2024-01-17 ENCOUNTER — TELEPHONE (OUTPATIENT)
Dept: WOUND CARE | Age: 74
End: 2024-01-17

## 2024-01-17 RX ORDER — PENICILLIN V POTASSIUM 500 MG/1
TABLET ORAL
Qty: 60 TABLET | Refills: 11 | Status: SHIPPED | OUTPATIENT
Start: 2024-01-17

## 2024-01-17 NOTE — TELEPHONE ENCOUNTER
Received an [employer] message from [employer] [facility] yesterday that she was due for approval for additional refills on her oral PCN.    Phoned her this AM, she's doing very well overall, takes her PCN twice a day, no episodes of cellulitis in the last year, tolerating it well with no side effects. Wears her compression stocking when she's going to be up on her feet a lot, not necessarily every day. No recent open leg wounds.    I went ahead and called in another year of refills, no changes to long-term suppressive therapy plan for her cellulitis, from my standpoint (as severe as prior episodes were, and as quickly as she relapsed when she tried to stop the Abx once before).     She can certainly call any time with concerns about the medication, her legs, etc. Otherwise I'll probably be speaking with her in another year, when we need to decide on another year of PCN therapy.     Electronically signed by Terry Chambers MD on 1/17/2024 at 10:02 AM

## 2024-03-22 ENCOUNTER — OFFICE VISIT (OUTPATIENT)
Dept: ORTHOPEDIC SURGERY | Age: 74
End: 2024-03-22

## 2024-03-22 VITALS — WEIGHT: 248 LBS | BODY MASS INDEX: 41.32 KG/M2 | HEIGHT: 65 IN

## 2024-03-22 DIAGNOSIS — M19.012 PRIMARY OSTEOARTHRITIS OF BOTH SHOULDERS: Primary | ICD-10-CM

## 2024-03-22 DIAGNOSIS — M25.511 BILATERAL SHOULDER PAIN, UNSPECIFIED CHRONICITY: ICD-10-CM

## 2024-03-22 DIAGNOSIS — M19.011 PRIMARY OSTEOARTHRITIS OF BOTH SHOULDERS: Primary | ICD-10-CM

## 2024-03-22 DIAGNOSIS — M25.511 RIGHT ANTERIOR SHOULDER PAIN: ICD-10-CM

## 2024-03-22 DIAGNOSIS — M25.512 BILATERAL SHOULDER PAIN, UNSPECIFIED CHRONICITY: ICD-10-CM

## 2024-03-22 RX ORDER — BUPIVACAINE HYDROCHLORIDE 2.5 MG/ML
7 INJECTION, SOLUTION INFILTRATION; PERINEURAL ONCE
Status: COMPLETED | OUTPATIENT
Start: 2024-03-22 | End: 2024-03-22

## 2024-03-22 RX ORDER — TRIAMCINOLONE ACETONIDE 40 MG/ML
40 INJECTION, SUSPENSION INTRA-ARTICULAR; INTRAMUSCULAR ONCE
Status: COMPLETED | OUTPATIENT
Start: 2024-03-22 | End: 2024-03-22

## 2024-03-22 RX ORDER — METHYLPREDNISOLONE 4 MG
TABLET, DOSE PACK ORAL
Qty: 1 KIT | Refills: 0 | Status: SHIPPED | OUTPATIENT
Start: 2024-03-22

## 2024-03-22 RX ADMIN — TRIAMCINOLONE ACETONIDE 40 MG: 40 INJECTION, SUSPENSION INTRA-ARTICULAR; INTRAMUSCULAR at 09:44

## 2024-03-22 RX ADMIN — BUPIVACAINE HYDROCHLORIDE 17.5 MG: 2.5 INJECTION, SOLUTION INFILTRATION; PERINEURAL at 09:42

## 2024-03-22 NOTE — PROGRESS NOTES
Pulses:  Intact.     Shoulder Exam  She has a full range of motion of the neck.  Her neurocirculatory lymphatic exam is normal symmetric to both upper extremities.  She has good cervical range of motion negative Spurling's.  She has no pain to palpation down the paraspinous muscle of the chair but has some pain in the trapezial ridge and pectoralis minor region.  Examination of the shoulder reveals: She has limited range of motion of both shoulders right worse than left with both abduction external and internal rotation and forward flexion.  She has crepitus in both shoulders with range of motion.  There are no palpable masses or evidence of effusion    She has no tenderness over the proximal biceps. She has a full active range of motion of the elbow, wrist and hand.     Range of motion  (in degrees)   Right Left   ABDUCTION       EXT. ROTATION       INT. ROTATION       FORWARD FLEX    STRENGTH         Provocative Test Positive Negative Not indicated   Spurling Sign [] [x] []   Cross Arm Adduction Test [x] [] []   Apprehension Sign [] [] [x]   Neer Sign [] [] []   [place] Impingement Sign [x] [] []   Yergason test [] [] []   O’Randy’s test [] [] []     Other Provocative tests:     IMAGING STUDIES    X-rays 2 views of both shoulders reveals severe right worse than left shoulder glenohumeral arthritis.  I showed her the x-rays and shoulder that the arthritis in the left shoulder is substantially more advanced than x-rays of the left shoulder taken 10 years ago.    IMPRESSION    Bilateral shoulder pain secondary to osteoarthritis    PLAN    1.  Conservative care options including medicines and therapy were discussed.   2.  The indications for therapeutic injections were discussed.   3.  The indications for additional imaging studies were discussed.   4.  After considering the various options discussed, the patient elected to pursue a course that includes a cortisone injection of the right shoulder, being placed on an

## 2024-12-16 ENCOUNTER — HOSPITAL ENCOUNTER (OUTPATIENT)
Dept: MAMMOGRAPHY | Age: 74
Discharge: HOME | End: 2024-12-04
Attending: FAMILY MEDICINE

## 2024-12-16 DIAGNOSIS — N63.11 MASS OF UPPER OUTER QUADRANT OF RIGHT BREAST: ICD-10-CM

## 2025-01-15 ENCOUNTER — HOSPITAL ENCOUNTER (OUTPATIENT)
Dept: MAMMOGRAPHY | Age: 75
Discharge: HOME | End: 2025-01-15
Attending: FAMILY MEDICINE
Payer: MEDICARE

## 2025-01-15 ENCOUNTER — HOSPITAL ENCOUNTER (OUTPATIENT)
Dept: ULTRASOUND IMAGING | Age: 75
Discharge: HOME | End: 2025-01-15
Attending: FAMILY MEDICINE
Payer: MEDICARE

## 2025-01-15 VITALS — BODY MASS INDEX: 35.16 KG/M2 | WEIGHT: 211 LBS | HEIGHT: 65 IN

## 2025-01-15 DIAGNOSIS — N63.11 MASS OF UPPER OUTER QUADRANT OF RIGHT BREAST: ICD-10-CM

## 2025-01-15 PROCEDURE — 76642 ULTRASOUND BREAST LIMITED: CPT

## 2025-01-15 PROCEDURE — G0279 TOMOSYNTHESIS, MAMMO: HCPCS

## 2025-01-16 ENCOUNTER — TELEPHONE (OUTPATIENT)
Dept: INFECTIOUS DISEASES | Age: 75
End: 2025-01-16

## 2025-01-16 RX ORDER — PENICILLIN V POTASSIUM 500 MG/1
500 TABLET, FILM COATED ORAL 2 TIMES DAILY
Qty: 180 TABLET | Refills: 3 | Status: SHIPPED | OUTPATIENT
Start: 2025-01-16 | End: 2026-01-11

## 2025-01-16 NOTE — TELEPHONE ENCOUNTER
Received an [employer] message from [employer] a couple of days ago that she was due for approval for additional refills on her oral PCN.    Phoned her this AM, she's doing very well overall, takes her PCN twice a day, no episodes of cellulitis in the last year, tolerating it well with no side effects. Wears her compression stocking when she's going to be up on her feet a lot, not necessarily every day. No recent open leg wounds.     I went ahead and called in another year of refills (but instead of #60 x 12, we did #180 x 4, so she didn't have to  a new bottle every single month), with no plans to change long-term suppressive therapy plan for her cellulitis, from my standpoint (as severe as prior episodes were, and as quickly as she relapsed when she tried to stop the Abx once before). She felt comfortable with that.      She can certainly call any time with concerns about the medication, her legs, etc. Otherwise I'll probably be speaking with her in another year, when we need to decide on another year of PCN therapy.     Electronically signed by Terry Chambers MD on 1/16/2025 at 9:33 AM

## 2025-05-19 NOTE — DISCHARGE INSTRUCTIONS
Wound Care Center Physician Orders and Discharge Instructions  [facility]  [address], [address]  [place] [id number]  Telephone: [phone]      Fax: [phone]      Your home care company:  None .    Your wound-care supplies will be provided by:  Self .    NAME:  Melissa Mccoy   YOB: 1950  PRIMARY DIAGNOSIS FOR WOUND CARE CENTER:  Trauma .    Wound cleansing:   Do not scrub or use excessive force.  Wash hands with soap and water before and after dressing changes.  Prior to applying a clean dressing, cleanse wound with normal saline, wound cleanser, or mild soap and water. Ask your physician or nurse before getting the wound(s) wet in the shower.     Wound care for home:    Right Knee Leg   Wash gently with soap and water. Pat dry  Antibiotic ointment  Band Aid  Change Daily   G Medigrip from top of calf to top of surgical scar     Right and Left Lower Leg  Double Medium Medigrip toes to knees   You may take these off at night and replace in the morning. You may also keep in place if you would like        Please note, all wounds (unless stated otherwise here) were mechanically debrided at the time of cleansing here in the wound-care center today, so a small amount of pain, drainage or bleeding from that process might be expected, and is normal.     All products for home use, including multiple products for a single wound if applicable, are medically necessary in order to achieve the best chance at timely wound healing. See provider documentation for details if needed.    Substituted dressings applied in the [facility] today, if applicable:        New orders for this week (labs, imaging, medications, etc.):  5/23/25 - Rx for Gabapentin sent to [facility] - take as directed     Additional instructions for specific diagnoses:    5/23/25 - To aid in edema control, please elevate your legs as much as possible.  Walking and moving will also help.   5/23/25 - Ok to ice

## 2025-05-20 ENCOUNTER — OUTSIDE SERVICES (OUTPATIENT)
Dept: FAMILY MEDICINE CLINIC | Age: 75
End: 2025-05-20
Payer: MEDICARE

## 2025-05-20 DIAGNOSIS — J41.0 SIMPLE CHRONIC BRONCHITIS (HCC): ICD-10-CM

## 2025-05-20 DIAGNOSIS — E11.9 TYPE 2 DIABETES MELLITUS WITHOUT COMPLICATION, WITHOUT LONG-TERM CURRENT USE OF INSULIN (HCC): ICD-10-CM
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
## 2025-05-20 DIAGNOSIS — S80.11XD HEMATOMA OF RIGHT LOWER EXTREMITY, SUBSEQUENT ENCOUNTER: ICD-10-CM

## 2025-05-20 PROCEDURE — 99305 1ST NF CARE MODERATE MDM 35: CPT | Performed by: FAMILY MEDICINE

## 2025-05-23 ENCOUNTER — HOSPITAL ENCOUNTER (OUTPATIENT)
Dept: WOUND CARE | Age: 75
Discharge: HOME | End: 2025-05-23
Attending: INTERNAL MEDICINE
Payer: MEDICARE

## 2025-05-23 VITALS
BODY MASS INDEX: 32.36 KG/M2 | WEIGHT: 194.2 LBS | SYSTOLIC BLOOD PRESSURE: 132 MMHG | HEIGHT: 65 IN | RESPIRATION RATE: 20 BRPM | DIASTOLIC BLOOD PRESSURE: 81 MMHG | HEART RATE: 74 BPM | TEMPERATURE: 98.2 F

## 2025-05-23 DIAGNOSIS — M79.2 NEUROPATHIC PAIN OF LEFT LOWER EXTREMITY: ICD-10-CM
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
## 2025-05-23 DIAGNOSIS — Z79.01 WARFARIN ANTICOAGULATION: ICD-10-CM

## 2025-05-23 DIAGNOSIS — I89.0 LYMPHEDEMA OF BOTH LOWER EXTREMITIES: ICD-10-CM

## 2025-05-23 DIAGNOSIS — L03.119 RECURRENT CELLULITIS OF LOWER EXTREMITY: ICD-10-CM

## 2025-05-23 DIAGNOSIS — S80.211A ABRASION, RIGHT KNEE, INITIAL ENCOUNTER: Primary | ICD-10-CM
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
## 2025-05-23 PROBLEM — E66.811 CLASS 1 OBESITY DUE TO EXCESS CALORIES WITH SERIOUS COMORBIDITY AND BODY MASS INDEX (BMI) OF 32.0 TO 32.9 IN ADULT: Status: ACTIVE | Noted: 2022-12-21

## 2025-05-23 PROBLEM — M25.511 RIGHT ANTERIOR SHOULDER PAIN: Status: RESOLVED | Noted: 2024-03-22 | Resolved: 2025-05-23

## 2025-05-23 PROBLEM — E66.09 CLASS 1 OBESITY DUE TO EXCESS CALORIES WITH SERIOUS COMORBIDITY AND BODY MASS INDEX (BMI) OF 32.0 TO 32.9 IN ADULT: Status: ACTIVE | Noted: 2022-12-21

## 2025-05-23 PROBLEM — L91.0 HYPERTROPHIC SCAR OF SKIN: Status: RESOLVED | Noted: 2023-01-03 | Resolved: 2025-05-23

## 2025-05-23 PROCEDURE — 97597 DBRDMT OPN WND 1ST 20 CM/<: CPT

## 2025-05-23 RX ORDER — LIDOCAINE 40 MG/G
CREAM TOPICAL PRN
OUTPATIENT
Start: 2025-05-23

## 2025-05-23 RX ORDER — LIDOCAINE HYDROCHLORIDE 40 MG/ML
SOLUTION TOPICAL PRN
OUTPATIENT
Start: 2025-05-23

## 2025-05-23 RX ORDER — LIDOCAINE 50 MG/G
OINTMENT TOPICAL PRN
OUTPATIENT
Start: 2025-05-23

## 2025-05-23 RX ORDER — LIDOCAINE 40 MG/G
CREAM TOPICAL PRN
Status: DISCONTINUED | OUTPATIENT
Start: 2025-05-23 | End: 2025-05-24 | Stop reason: HOSPADM

## 2025-05-23 RX ORDER — BACITRACIN ZINC AND POLYMYXIN B SULFATE 500; 1000 [USP'U]/G; [USP'U]/G
OINTMENT TOPICAL PRN
OUTPATIENT
Start: 2025-05-23

## 2025-05-23 RX ORDER — LYSINE HCL 500 MG
TABLET ORAL
COMMUNITY

## 2025-05-23 RX ORDER — TRIAMCINOLONE ACETONIDE 1 MG/G
OINTMENT TOPICAL PRN
OUTPATIENT
Start: 2025-05-23

## 2025-05-23 RX ORDER — HYDROCHLOROTHIAZIDE 25 MG/1
25 TABLET ORAL DAILY
COMMUNITY

## 2025-05-23 RX ORDER — GABAPENTIN 100 MG/1
CAPSULE ORAL
Qty: 90 CAPSULE | Refills: 0 | Status: SHIPPED | OUTPATIENT
Start: 2025-05-23 | End: 2025-06-24

## 2025-05-23 ASSESSMENT — PAIN DESCRIPTION - FREQUENCY: FREQUENCY: CONTINUOUS

## 2025-05-23 ASSESSMENT — PAIN DESCRIPTION - ORIENTATION: ORIENTATION: RIGHT

## 2025-05-23 ASSESSMENT — PAIN - FUNCTIONAL ASSESSMENT: PAIN_FUNCTIONAL_ASSESSMENT: ACTIVITIES ARE NOT PREVENTED

## 2025-05-23 ASSESSMENT — PAIN SCALES - GENERAL: PAINLEVEL_OUTOF10: 10

## 2025-05-23 ASSESSMENT — PAIN DESCRIPTION - DESCRIPTORS: DESCRIPTORS: ACHING;DULL

## 2025-05-23 ASSESSMENT — PAIN DESCRIPTION - LOCATION: LOCATION: LEG

## 2025-05-23 ASSESSMENT — PAIN DESCRIPTION - ONSET: ONSET: ON-GOING

## 2025-05-23 ASSESSMENT — PAIN DESCRIPTION - PAIN TYPE: TYPE: ACUTE PAIN

## 2025-05-23 NOTE — PLAN OF CARE
Patient seen in [facility] as a new patient. Patient suffered fall on Mother's Day. Right side is completely bruised. Right knee with open wound. Patient has been using Comfree Salve on wound at home. Patient complains of pain in right knee and leg, described as nerve pain. BLE edema noted. Right greater than left. Patient has been unable to wear compression stockings r/t pain. Gabapentin discussed today to aid in the nerve pain. Rx sent to patient pharmacy. Patient to take as directed.  F/u in [facility] in 1 week as ordered, pt. Aware to call sooner with any changes or questions/concerns. Discharge instructions reviewed with patient, all questions answered, copy given to patient. Dressings were applied to all wounds per M.D. Instructions at this visit.

## 2025-05-24 NOTE — PROGRESS NOTES
L03.119    Class 1 obesity due to excess calories with serious comorbidity and body mass index (BMI) of 32.0 to 32.9 in adult E66.811, E66.09, Z68.32    Degeneration of intervertebral disc, site unspecified [id number]    Mixed hyperlipidemia E78.2    Mixed stress and urge urinary incontinence N39.46    Nuclear sclerotic cataract of both eyes H25.13    Paroxysmal atrial fibrillation (HCC) I48.0    Reflux esophagitis K21.00    Simple chronic bronchitis (HCC) J41.0    Type 2 diabetes mellitus without complication, without long-term current use of insulin (HCC) E11.9    Primary osteoarthritis of both shoulders M19.011, M19.012    Abrasion, right knee, initial encounter S80.211A    Fall at home, sequela W19.XXXS, Y92.009    Neuropathic pain of right lower and left upper extremity M79.2    Warfarin anticoagulation Z79.01       Assessment of today's active condition(s):      --          Background of obesity, COPD, heart disease, well-controlled DM, AVR, chronic warfarin use.      --          LLE lymphedema related to prior TKR, perhaps some venous disease, and then also a severe episode of cellulitis a few years ago, which certainly did sound like Strep infection. More or less successful suppression with oral PCN for a few years, but then within a week or so of stopping PCN, an acute relapse in LLE cellulitis about 2.5 years ago, again likely Strep. Again resolved with Abx, though it did take a short period of dual PCN-Clinda therapy to get her over the hump. Now on chronic suppressive oral PCN again, doing well. Lymphedema usually mildly symptomatic with exercise, elevation, stockings.      --          GI intolerance of clinda, important to note for the future.     -- A couple of small sometimes intensely pruritic lesions on the lower legs, where prior ulcers had been - appearance of a hypertrophic scar, I think, and I wonder if there could be an element of prurigo nodularis too. That's not been as active

## 2025-05-27 NOTE — DISCHARGE INSTRUCTIONS
Wound Care Center Physician Orders and Discharge Instructions  [facility]  [address], [address]  [place] [address]  Telephone: [phone]      Fax: [phone]        Your home care company:   None .     Your wound-care supplies will be provided by:  Self .     NAME:  Melissa Mccoy   YOB: 1950  PRIMARY DIAGNOSIS FOR WOUND CARE CENTER:  Trauma .     Wound cleansing:   Do not scrub or use excessive force.  Wash hands with soap and water before and after dressing changes.  Prior to applying a clean dressing, cleanse wound with normal saline, wound cleanser, or mild soap and water. Ask your physician or nurse before getting the wound(s) wet in the shower.                Wound care for home:     Right Knee Leg   Wash gently with soap and water. Pat dry  Antibiotic ointment  Band Aid  Change Daily   G Medigrip from top of calf to top of surgical scar      Right and Left Lower Leg  Double Medium Medigrip toes to knees   You may take these off at night and replace in the morning. You may also keep in place if you would like          Please note, all wounds (unless stated otherwise here) were mechanically debrided at the time of cleansing here in the wound-care center today, so a small amount of pain, drainage or bleeding from that process might be expected, and is normal.      All products for home use, including multiple products for a single wound if applicable, are medically necessary in order to achieve the best chance at timely wound healing. See provider documentation for details if needed.     Substituted dressings applied in the [facility] today, if applicable:           New orders for this week (labs, imaging, medications, etc.):       Additional instructions for specific diagnoses:  5/29-Recommend following up with dermatology for left leg wound  5/29-OK to swim at the [facility] (no lakes/ocean)  5/29-Follow the previously prescribed gabapentin as ordered (use caution

## 2025-05-28 PROBLEM — S80.11XA HEMATOMA OF RIGHT LOWER EXTREMITY: Status: ACTIVE | Noted: 2025-05-28
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
## 2025-05-29 ENCOUNTER — HOSPITAL ENCOUNTER (OUTPATIENT)
Dept: WOUND CARE | Age: 75
Discharge: HOME | End: 2025-05-29
Attending: INTERNAL MEDICINE
Payer: MEDICARE

## 2025-05-29 VITALS
SYSTOLIC BLOOD PRESSURE: 102 MMHG | HEART RATE: 74 BPM | BODY MASS INDEX: 32.15 KG/M2 | DIASTOLIC BLOOD PRESSURE: 63 MMHG | RESPIRATION RATE: 18 BRPM | HEIGHT: 65 IN | TEMPERATURE: 97.9 F | WEIGHT: 193 LBS
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
## 2025-05-29 DIAGNOSIS — I89.0 LYMPHEDEMA OF BOTH LOWER EXTREMITIES: ICD-10-CM
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
## 2025-05-29 DIAGNOSIS — S80.211A ABRASION, RIGHT KNEE, INITIAL ENCOUNTER: Primary | ICD-10-CM

## 2025-05-29 DIAGNOSIS — M79.2 NEUROPATHIC PAIN OF LEFT LOWER EXTREMITY: ICD-10-CM

## 2025-05-29 DIAGNOSIS — L03.119 RECURRENT CELLULITIS OF LOWER EXTREMITY: ICD-10-CM

## 2025-05-29 PROCEDURE — 99212 OFFICE O/P EST SF 10 MIN: CPT

## 2025-05-29 RX ORDER — LIDOCAINE HYDROCHLORIDE 40 MG/ML
SOLUTION TOPICAL PRN
OUTPATIENT
Start: 2025-05-29

## 2025-05-29 RX ORDER — HYDROCODONE BITARTRATE AND ACETAMINOPHEN 5; 325 MG/1; MG/1
1 TABLET ORAL EVERY 6 HOURS PRN
COMMUNITY
Start: 2025-05-20

## 2025-05-29 RX ORDER — MAGNESIUM OXIDE 400 MG/1
400 TABLET ORAL DAILY
COMMUNITY

## 2025-05-29 RX ORDER — LIDOCAINE HYDROCHLORIDE 40 MG/ML
SOLUTION TOPICAL PRN
Status: DISCONTINUED | OUTPATIENT
Start: 2025-05-29 | End: 2025-05-30 | Stop reason: HOSPADM

## 2025-05-29 RX ORDER — LIDOCAINE 50 MG/G
OINTMENT TOPICAL PRN
OUTPATIENT
Start: 2025-05-29

## 2025-05-29 RX ORDER — LIDOCAINE 40 MG/G
CREAM TOPICAL PRN
Status: DISCONTINUED | OUTPATIENT
Start: 2025-05-29 | End: 2025-05-30 | Stop reason: HOSPADM

## 2025-05-29 RX ORDER — BACITRACIN ZINC AND POLYMYXIN B SULFATE 500; 1000 [USP'U]/G; [USP'U]/G
OINTMENT TOPICAL PRN
OUTPATIENT
Start: 2025-05-29

## 2025-05-29 RX ORDER — TRIAMCINOLONE ACETONIDE 1 MG/G
OINTMENT TOPICAL PRN
Status: DISCONTINUED | OUTPATIENT
Start: 2025-05-29 | End: 2025-05-30 | Stop reason: HOSPADM

## 2025-05-29 RX ORDER — BACITRACIN ZINC AND POLYMYXIN B SULFATE 500; 1000 [USP'U]/G; [USP'U]/G
OINTMENT TOPICAL PRN
Status: DISCONTINUED | OUTPATIENT
Start: 2025-05-29 | End: 2025-05-30 | Stop reason: HOSPADM

## 2025-05-29 RX ORDER — POTASSIUM CHLORIDE 750 MG/1
10 TABLET, EXTENDED RELEASE ORAL DAILY
COMMUNITY
Start: 2025-05-15

## 2025-05-29 RX ORDER — LIDOCAINE 50 MG/G
OINTMENT TOPICAL PRN
Status: DISCONTINUED | OUTPATIENT
Start: 2025-05-29 | End: 2025-05-30 | Stop reason: HOSPADM

## 2025-05-29 RX ORDER — TRIAMCINOLONE ACETONIDE 1 MG/G
OINTMENT TOPICAL PRN
OUTPATIENT
Start: 2025-05-29

## 2025-05-29 RX ORDER — LIDOCAINE 40 MG/G
CREAM TOPICAL PRN
OUTPATIENT
Start: 2025-05-29

## 2025-05-29 NOTE — PLAN OF CARE
Pt to the [facility] for wound assessment. Left pre-tib wound stable. Right knee healed. Discussed lymphedema. Pt has been using comfree salve on wound at home. Pt requesting refill on vicodin (referred onto PCP). Pt did notice improvement with gabapentin and improved sleep (pt has only been taking 1 tablet HS, was unaware of entire prescription dosage, agreeable to increase dose as previously prescribed with using caution. Recommended dermatology for LLE wound. Family at bedside. Pt to continue with documented plan of care and to follow up in the [facility] as needed. Pt. aware to call sooner with any problems or questions/concerns. MD orders and D/C instructions reviewed, pt verbalized understanding.

## 2025-05-30 ENCOUNTER — OFFICE VISIT (OUTPATIENT)
Dept: ORTHOPEDIC SURGERY | Age: 75
End: 2025-05-30

## 2025-05-30 DIAGNOSIS — T14.8XXA HEMATOMA: ICD-10-CM

## 2025-05-30 DIAGNOSIS — M25.561 RIGHT KNEE PAIN, UNSPECIFIED CHRONICITY: ICD-10-CM

## 2025-05-30 DIAGNOSIS — T84.093A FAILED TOTAL LEFT KNEE REPLACEMENT, INITIAL ENCOUNTER: Primary | ICD-10-CM

## 2025-05-30 NOTE — PROGRESS NOTES
motion.            Diagnostics:  Radiology:       3 views of the right knee taken in the office today demonstrates a stable Lalen total knee arthroplasty without signs of loosening with a good cement mantle.  Similar findings to the left knee.    Results  Labs   - INR: 8    Imaging   - X-ray of the right knee: No problem with the bone or the implant        Assessment: 75 y.o. female with right knee and thigh hematoma, failed left total knee arthroplasty    Comorbidities: On Coumadin, diabetic    Plan: Pertinent imaging was reviewed. The etiology, natural history, and treatment options for the disorder were discussed.  The roles of activity medication, antiinflammatories, injections, bracing, physical therapy, and surgical interventions were all described to the patient and questions were answered.    Assessment & Plan  1. Right knee hematoma:    The elevated INR at the time of trauma likely contributed to the development of a significant hematoma. The x-ray results are unremarkable, indicating no issues with the bone or the implant. The joint remains intact, and there is no evidence of compartment syndrome. Surgical intervention is not deemed necessary at this point. Apply ice and use an Ace wrap for compression as tolerated.    2. Left knee instability:    The left knee exhibits greater than 1 cm opening with varus stress laterally and greater than 1 cm anterior shuck at 90 degrees, indicating significant instability. A brace will be fitted to provide side-to-side stability. A referral to Dr. Perez, a specialist in joint reconstruction, will be made for further evaluation and discussion of potential surgical options, including poly exchange or revision of the implants.    Follow-up:  Referral to Dr. Perez for further evaluation of the left knee instability.    . Melissa Mccoy is in agreement with this plan. All questions were answered to patient's satisfaction and was encouraged to call with any further questions.

## 2025-06-05 ENCOUNTER — TELEPHONE (OUTPATIENT)
Dept: ORTHOPEDIC SURGERY | Age: 75
End: 2025-06-05

## 2025-06-05 NOTE — TELEPHONE ENCOUNTER
LVM regarding if she received her brace and if she signed her POD encounter form. If not, we can discuss how to get that signed.

## 2025-06-07 PROBLEM — S80.211A ABRASION, RIGHT KNEE, INITIAL ENCOUNTER: Status: RESOLVED | Noted: 2025-05-23 | Resolved: 2025-06-07
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
## 2025-06-09 ENCOUNTER — TELEPHONE (OUTPATIENT)
Dept: ORTHOPEDIC SURGERY | Age: 75
End: 2025-06-09

## 2025-06-12 NOTE — DISCHARGE INSTRUCTIONS
Wound Care Center Physician Orders and Discharge Instructions  [facility]  [address], [address]  [place] [address]  Telephone: [phone]      Fax: [phone]        Your home care company:   None .     Your wound-care supplies will be provided by:  Self .     NAME:  Melissa Mccoy   YOB: 1950  PRIMARY DIAGNOSIS FOR WOUND CARE CENTER:  Infection/abscess .     Wound cleansing:   Do not scrub or use excessive force.  Wash hands with soap and water before and after dressing changes.  Prior to applying a clean dressing, cleanse wound with normal saline, wound cleanser, or mild soap and water. Ask your physician or nurse before getting the wound(s) wet in the shower.                Wound care for home:     Left lower Leg   Wash gently with soap and water. Pat dry  Apply Lidocaine 4% cream, let sit 15-20 minutes then wipe away & apply new dressing    Triad over wound  1/4\" Iodoform gently tucked into wound (send remaining Iodoform with patient to use at home)  Cover with dry dressing   Change Daily        Right and Left Lower Leg  Apply compression stocking to help control swelling in legs  You may take compression off at night and reapply in the morning. You may also keep in place if you would like          Please note, all wounds (unless stated otherwise here) were mechanically debrided at the time of cleansing here in the wound-care center today, so a small amount of pain, drainage or bleeding from that process might be expected, and is normal.      All products for home use, including multiple products for a single wound if applicable, are medically necessary in order to achieve the best chance at timely wound healing. See provider documentation for details if needed.     Substituted dressings applied in the [facility] today, if applicable:     N/a      New orders for this week (labs, imaging, medications, etc.):    Script for Vicodin & Lidocaine 4% cream sent to

## 2025-06-18 ENCOUNTER — HOSPITAL ENCOUNTER (OUTPATIENT)
Dept: WOUND CARE | Age: 75
Discharge: HOME | End: 2025-06-18
Attending: INTERNAL MEDICINE
Payer: MEDICARE

## 2025-06-18 VITALS
DIASTOLIC BLOOD PRESSURE: 65 MMHG | BODY MASS INDEX: 29.16 KG/M2 | HEIGHT: 65 IN | TEMPERATURE: 98.4 F | RESPIRATION RATE: 18 BRPM | HEART RATE: 70 BPM | WEIGHT: 175 LBS | SYSTOLIC BLOOD PRESSURE: 114 MMHG

## 2025-06-18 DIAGNOSIS — I89.0 LYMPHEDEMA OF BOTH LOWER EXTREMITIES: ICD-10-CM

## 2025-06-18 DIAGNOSIS — L02.416 ABSCESS OF LEFT LOWER LEG: ICD-10-CM

## 2025-06-18 DIAGNOSIS — M79.605 ACUTE LEG PAIN, LEFT: Primary | ICD-10-CM

## 2025-06-18 DIAGNOSIS — A49.02 MRSA INFECTION: ICD-10-CM

## 2025-06-18 DIAGNOSIS — M79.2 NEUROPATHIC PAIN OF LEFT LOWER EXTREMITY: ICD-10-CM

## 2025-06-18 DIAGNOSIS — L03.119 RECURRENT CELLULITIS OF LOWER EXTREMITY: ICD-10-CM

## 2025-06-18 DIAGNOSIS — M79.605 ACUTE PAIN OF LEFT LOWER EXTREMITY: ICD-10-CM

## 2025-06-18 PROBLEM — E66.3 OVERWEIGHT (BMI 25.0-29.9): Status: ACTIVE | Noted: 2022-12-21
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
## 2025-06-18 PROBLEM — S80.11XA HEMATOMA OF RIGHT LOWER EXTREMITY: Status: RESOLVED | Noted: 2025-05-28 | Resolved: 2025-06-18

## 2025-06-18 PROCEDURE — 11042 DBRDMT SUBQ TIS 1ST 20SQCM/<: CPT

## 2025-06-18 RX ORDER — LIDOCAINE 50 MG/G
OINTMENT TOPICAL PRN
Status: DISCONTINUED | OUTPATIENT
Start: 2025-06-18 | End: 2025-06-19 | Stop reason: HOSPADM

## 2025-06-18 RX ORDER — LIDOCAINE 50 MG/G
OINTMENT TOPICAL PRN
OUTPATIENT
Start: 2025-06-18

## 2025-06-18 RX ORDER — LIDOCAINE AND PRILOCAINE 25; 25 MG/G; MG/G
CREAM TOPICAL
Qty: 30 G | Refills: 1 | Status: SHIPPED | OUTPATIENT
Start: 2025-06-18

## 2025-06-18 RX ORDER — BACITRACIN ZINC AND POLYMYXIN B SULFATE 500; 1000 [USP'U]/G; [USP'U]/G
OINTMENT TOPICAL PRN
OUTPATIENT
Start: 2025-06-18

## 2025-06-18 RX ORDER — BACITRACIN ZINC AND POLYMYXIN B SULFATE 500; 1000 [USP'U]/G; [USP'U]/G
OINTMENT TOPICAL PRN
Status: DISCONTINUED | OUTPATIENT
Start: 2025-06-18 | End: 2025-06-19 | Stop reason: HOSPADM

## 2025-06-18 RX ORDER — LIDOCAINE HYDROCHLORIDE 40 MG/ML
SOLUTION TOPICAL PRN
OUTPATIENT
Start: 2025-06-18

## 2025-06-18 RX ORDER — LIDOCAINE HYDROCHLORIDE 40 MG/ML
SOLUTION TOPICAL PRN
Status: DISCONTINUED | OUTPATIENT
Start: 2025-06-18 | End: 2025-06-19 | Stop reason: HOSPADM

## 2025-06-18 RX ORDER — TRIAMCINOLONE ACETONIDE 1 MG/G
OINTMENT TOPICAL PRN
Status: DISCONTINUED | OUTPATIENT
Start: 2025-06-18 | End: 2025-06-19 | Stop reason: HOSPADM

## 2025-06-18 RX ORDER — HYDROCODONE BITARTRATE AND ACETAMINOPHEN 5; 325 MG/1; MG/1
1 TABLET ORAL EVERY 6 HOURS PRN
Qty: 28 TABLET | Refills: 0 | Status: SHIPPED | OUTPATIENT
Start: 2025-06-18 | End: 2025-06-25

## 2025-06-18 RX ORDER — TRIAMCINOLONE ACETONIDE 1 MG/G
OINTMENT TOPICAL PRN
OUTPATIENT
Start: 2025-06-18

## 2025-06-18 RX ORDER — LIDOCAINE 40 MG/G
CREAM TOPICAL PRN
OUTPATIENT
Start: 2025-06-18

## 2025-06-18 RX ORDER — LIDOCAINE 40 MG/G
CREAM TOPICAL PRN
Status: DISCONTINUED | OUTPATIENT
Start: 2025-06-18 | End: 2025-06-19 | Stop reason: HOSPADM

## 2025-06-18 RX ORDER — LINEZOLID 600 MG/1
600 TABLET, FILM COATED ORAL 2 TIMES DAILY
COMMUNITY
Start: 2025-06-12

## 2025-06-18 ASSESSMENT — PAIN SCALES - GENERAL: PAINLEVEL_OUTOF10: 0

## 2025-06-18 NOTE — PLAN OF CARE
Pt. Returning to [facility] today for an abscess on left leg that started on 1.5-2 weeks ago. Wound culture & zyvox per PCP. Pt. To continue Zyvox as previously ordered. Wound stable today, debridement per MD. Will start Triad, 1/4\" Iodoform tucked into wound, cover with dry dressing, change daily. Pt. Wearing compression stockings for edema control & to continue. F/u in [facility] in 1 week as ordered, pt. Aware to call sooner with any changes or questions/concerns. Discharge instructions reviewed with patient, all questions answered, copy given to patient. Dressings were applied to all wounds per M.D. Instructions at this visit.

## 2025-06-19 RX ORDER — GABAPENTIN 100 MG/1
100 CAPSULE ORAL 2 TIMES DAILY PRN
Qty: 60 CAPSULE | Refills: 1 | Status: SHIPPED | OUTPATIENT
Start: 2025-06-19 | End: 2025-08-18

## 2025-06-19 NOTE — DISCHARGE INSTRUCTIONS
Wound Care Center Physician Orders and Discharge Instructions  [facility]  [address], [address]  [place] [address]  Telephone: [phone]      Fax: [phone]        Your home care company:   None .     Your wound-care supplies will be provided by:  Self .     NAME:  Melissa Mccoy   YOB: 1950  PRIMARY DIAGNOSIS FOR WOUND CARE CENTER:  Infection/abscess .     Wound cleansing:   Do not scrub or use excessive force.  Wash hands with soap and water before and after dressing changes.  Prior to applying a clean dressing, cleanse wound with normal saline, wound cleanser, or mild soap and water. Ask your physician or nurse before getting the wound(s) wet in the shower.                Wound care for home:     Left lower Leg   Wash gently with soap and water. Pat dry  Apply Lidocaine 4% cream, let sit 15-20 minutes then wipe away & apply new dressing     Triad over wound  1/4\" Iodoform gently tucked into wound (send remaining Iodoform with patient to use at home)  Cover with dry dressing   Change Daily or every other day        Right and Left Lower Leg  Apply compression stocking to help control swelling in legs  You may take compression off at night and reapply in the morning. You may also keep in place if you would like          Please note, all wounds (unless stated otherwise here) were mechanically debrided at the time of cleansing here in the wound-care center today, so a small amount of pain, drainage or bleeding from that process might be expected, and is normal.      All products for home use, including multiple products for a single wound if applicable, are medically necessary in order to achieve the best chance at timely wound healing. See provider documentation for details if needed.     Substituted dressings applied in the [facility] today, if applicable:     N/a      New orders for this week (labs, imaging, medications, etc.):     You may shower, wash your leg

## 2025-06-25 ENCOUNTER — HOSPITAL ENCOUNTER (OUTPATIENT)
Dept: WOUND CARE | Age: 75
Discharge: HOME | End: 2025-06-25
Attending: INTERNAL MEDICINE
Payer: MEDICARE

## 2025-06-25 VITALS
HEART RATE: 83 BPM | TEMPERATURE: 98.2 F | WEIGHT: 171.2 LBS | HEIGHT: 65 IN | BODY MASS INDEX: 28.52 KG/M2 | DIASTOLIC BLOOD PRESSURE: 57 MMHG | SYSTOLIC BLOOD PRESSURE: 104 MMHG | RESPIRATION RATE: 18 BRPM

## 2025-06-25 DIAGNOSIS — I89.0 LYMPHEDEMA OF BOTH LOWER EXTREMITIES: ICD-10-CM

## 2025-06-25 DIAGNOSIS — M79.605 ACUTE LEG PAIN, LEFT: ICD-10-CM

## 2025-06-25 DIAGNOSIS — M79.2 NEUROPATHIC PAIN OF LEFT LOWER EXTREMITY: ICD-10-CM

## 2025-06-25 DIAGNOSIS — A49.02 MRSA INFECTION: ICD-10-CM

## 2025-06-25 DIAGNOSIS — L02.416 ABSCESS OF LEFT LOWER LEG: Primary | ICD-10-CM

## 2025-06-25 DIAGNOSIS — L03.119 RECURRENT CELLULITIS OF LOWER EXTREMITY: ICD-10-CM

## 2025-06-25 PROCEDURE — 11042 DBRDMT SUBQ TIS 1ST 20SQCM/<: CPT

## 2025-06-25 RX ORDER — BACITRACIN ZINC AND POLYMYXIN B SULFATE 500; 1000 [USP'U]/G; [USP'U]/G
OINTMENT TOPICAL PRN
Status: DISCONTINUED | OUTPATIENT
Start: 2025-06-25 | End: 2025-06-26 | Stop reason: HOSPADM

## 2025-06-25 RX ORDER — LIDOCAINE 50 MG/G
OINTMENT TOPICAL PRN
OUTPATIENT
Start: 2025-06-25

## 2025-06-25 RX ORDER — TRIAMCINOLONE ACETONIDE 1 MG/G
OINTMENT TOPICAL PRN
Status: DISCONTINUED | OUTPATIENT
Start: 2025-06-25 | End: 2025-06-26 | Stop reason: HOSPADM

## 2025-06-25 RX ORDER — BACITRACIN ZINC AND POLYMYXIN B SULFATE 500; 1000 [USP'U]/G; [USP'U]/G
OINTMENT TOPICAL PRN
OUTPATIENT
Start: 2025-06-25

## 2025-06-25 RX ORDER — TRIAMCINOLONE ACETONIDE 1 MG/G
OINTMENT TOPICAL PRN
OUTPATIENT
Start: 2025-06-25

## 2025-06-25 RX ORDER — LIDOCAINE HYDROCHLORIDE 40 MG/ML
SOLUTION TOPICAL PRN
OUTPATIENT
Start: 2025-06-25

## 2025-06-25 RX ORDER — LIDOCAINE 40 MG/G
CREAM TOPICAL PRN
Status: DISCONTINUED | OUTPATIENT
Start: 2025-06-25 | End: 2025-06-26 | Stop reason: HOSPADM

## 2025-06-25 RX ORDER — LIDOCAINE 40 MG/G
CREAM TOPICAL PRN
OUTPATIENT
Start: 2025-06-25

## 2025-06-25 RX ORDER — LIDOCAINE 50 MG/G
OINTMENT TOPICAL PRN
Status: DISCONTINUED | OUTPATIENT
Start: 2025-06-25 | End: 2025-06-26 | Stop reason: HOSPADM

## 2025-06-25 RX ORDER — LIDOCAINE HYDROCHLORIDE 40 MG/ML
SOLUTION TOPICAL PRN
Status: DISCONTINUED | OUTPATIENT
Start: 2025-06-25 | End: 2025-06-26 | Stop reason: HOSPADM

## 2025-06-25 NOTE — PLAN OF CARE
Wound stable with slight improvement this week, debridement per MD. Rash robbie-wound, but pt. States she had to use a bandaid & this happens with bandages. Will cont. With current wound care regime with dressings, pt. May change every other day. Pt. Is planning to return to using gauze dressings & avoiding adhesive on skin. Pt. To finish Zyvox antibiotic as directed & then to restart PCN as previously ordered, pt. Verbalizes understanding. F/u in [facility] in 2 weeks as ordered, pt. Aware to call sooner with any changes or questions/concerns. Discharge instructions reviewed with patient & spouse, all questions answered, copy given to patient. Dressings were applied to all wounds per M.D. Instructions at this visit.

## 2025-07-02 NOTE — DISCHARGE INSTRUCTIONS
Wound Care Center Physician Orders and Discharge Instructions  [facility]  [address], [address]  [place] [address]  Telephone: [phone]      Fax: [phone]        Your home care company:   None .     Your wound-care supplies will be provided by:  Self .     NAME:  Melissa Mccoy   YOB: 1950  PRIMARY DIAGNOSIS FOR WOUND CARE CENTER:  Infection/abscess .     Wound cleansing:   Do not scrub or use excessive force.  Wash hands with soap and water before and after dressing changes.  Prior to applying a clean dressing, cleanse wound with normal saline, wound cleanser, or mild soap and water. Ask your physician or nurse before getting the wound(s) wet in the shower.                Wound care for home:     Left lower Leg   Wash gently with soap and water. Pat dry  Apply Lidocaine 4% cream, let sit 15-20 minutes then wipe away & apply new dressing     Triad over wound  1/4\" Iodoform gently tucked into wound (send remaining Iodoform with patient to use at home)  Cover with dry dressing   Change Daily or every other day        Right and Left Lower Leg  Apply compression stocking to help control swelling in legs  You may take compression off at night and reapply in the morning. You may also keep in place if you would like          Please note, all wounds (unless stated otherwise here) were mechanically debrided at the time of cleansing here in the wound-care center today, so a small amount of pain, drainage or bleeding from that process might be expected, and is normal.      All products for home use, including multiple products for a single wound if applicable, are medically necessary in order to achieve the best chance at timely wound healing. See provider documentation for details if needed.     Substituted dressings applied in the [facility] today, if applicable:     N/a      New orders for this week (labs, imaging, medications, etc.):     You may shower, wash your leg

## 2025-07-09 ENCOUNTER — HOSPITAL ENCOUNTER (OUTPATIENT)
Dept: WOUND CARE | Age: 75
Discharge: HOME | End: 2025-07-09
Attending: INTERNAL MEDICINE

## 2025-07-13 PROBLEM — M79.605 ACUTE LEG PAIN, LEFT: Status: RESOLVED | Noted: 2025-06-18 | Resolved: 2025-07-13

## 2025-07-28 ENCOUNTER — TELEPHONE (OUTPATIENT)
Dept: WOUND CARE | Age: 75
End: 2025-07-28

## 2025-07-28 NOTE — TELEPHONE ENCOUNTER
Patient called to request ABX reported red swollen area on her LLE. Reports no open wounds and no other concerns. Denies n/v/c/d fever and chills.